# Patient Record
Sex: FEMALE | Race: BLACK OR AFRICAN AMERICAN | NOT HISPANIC OR LATINO | Employment: FULL TIME | URBAN - METROPOLITAN AREA
[De-identification: names, ages, dates, MRNs, and addresses within clinical notes are randomized per-mention and may not be internally consistent; named-entity substitution may affect disease eponyms.]

---

## 2020-12-09 ENCOUNTER — OFFICE VISIT (OUTPATIENT)
Dept: PRIMARY CARE CLINIC | Facility: CLINIC | Age: 55
End: 2020-12-09
Payer: MEDICAID

## 2020-12-09 VITALS
HEART RATE: 70 BPM | DIASTOLIC BLOOD PRESSURE: 88 MMHG | OXYGEN SATURATION: 99 % | WEIGHT: 293 LBS | BODY MASS INDEX: 50.02 KG/M2 | SYSTOLIC BLOOD PRESSURE: 142 MMHG | HEIGHT: 64 IN | RESPIRATION RATE: 18 BRPM

## 2020-12-09 DIAGNOSIS — Z23 FLU VACCINE NEED: ICD-10-CM

## 2020-12-09 DIAGNOSIS — E66.01 CLASS 3 SEVERE OBESITY WITH BODY MASS INDEX (BMI) OF 50.0 TO 59.9 IN ADULT, UNSPECIFIED OBESITY TYPE, UNSPECIFIED WHETHER SERIOUS COMORBIDITY PRESENT: ICD-10-CM

## 2020-12-09 DIAGNOSIS — F41.9 ANXIETY: Primary | ICD-10-CM

## 2020-12-09 DIAGNOSIS — Z12.39 ENCOUNTER FOR SCREENING FOR MALIGNANT NEOPLASM OF BREAST, UNSPECIFIED SCREENING MODALITY: ICD-10-CM

## 2020-12-09 PROBLEM — E66.813 CLASS 3 SEVERE OBESITY WITH BODY MASS INDEX (BMI) OF 50.0 TO 59.9 IN ADULT: Status: ACTIVE | Noted: 2020-12-09

## 2020-12-09 PROCEDURE — 99204 OFFICE O/P NEW MOD 45 MIN: CPT | Mod: 25,S$GLB,, | Performed by: INTERNAL MEDICINE

## 2020-12-09 PROCEDURE — 90686 IIV4 VACC NO PRSV 0.5 ML IM: CPT | Mod: S$GLB,,, | Performed by: INTERNAL MEDICINE

## 2020-12-09 PROCEDURE — 90471 PR IMMUNIZ ADMIN,1 SINGLE/COMB VAC/TOXOID: ICD-10-PCS | Mod: S$GLB,,, | Performed by: INTERNAL MEDICINE

## 2020-12-09 PROCEDURE — 90686 PR FLU VACCINE, QIIV4, NO PRSV, 0.5 ML, IM: ICD-10-PCS | Mod: S$GLB,,, | Performed by: INTERNAL MEDICINE

## 2020-12-09 PROCEDURE — 90471 IMMUNIZATION ADMIN: CPT | Mod: S$GLB,,, | Performed by: INTERNAL MEDICINE

## 2020-12-09 PROCEDURE — 99204 PR OFFICE/OUTPT VISIT, NEW, LEVL IV, 45-59 MIN: ICD-10-PCS | Mod: 25,S$GLB,, | Performed by: INTERNAL MEDICINE

## 2020-12-09 RX ORDER — HYDROXYZINE HYDROCHLORIDE 25 MG/1
25 TABLET, FILM COATED ORAL 3 TIMES DAILY PRN
Qty: 30 TABLET | Refills: 2 | Status: SHIPPED | OUTPATIENT
Start: 2020-12-09 | End: 2021-11-16

## 2020-12-09 NOTE — PROGRESS NOTES
Subjective:      Patient ID: Elvi Foote is a 54 y.o. female.    Chief Complaint: Establish Care and Annual Exam    HPI     Here to establish care. Had a colonoscopy last year in [place] and had some polyps removed and will likely need a repeat in 3-5 years. Last pap smear a long time ago, LMP 1.5 years ago  No smoking, no alcohol, no recreational drugs   Up to date on HIV and Hep C screening because she donates plasma   Reports migraines and has a history of them states it goes all over her head, has been to the ER in the past for a shot, takes a tylenol PM and sleeps it off  Has been tested for sleep apnea and does not have it  Reports having many things to do during the day and feels a bit anxious, wants to try something for anxiety and maybe start seeing a therapist      Family History   Problem Relation Age of Onset    Hypertension Mother     Arthritis Mother     Cancer Mother     Hypertension Father     Cancer Father     Arthritis Paternal Grandmother        Review of Systems   Constitutional: Negative for chills and fever.   HENT: Negative for congestion and ear pain.    Respiratory: Negative for cough, shortness of breath and wheezing.    Cardiovascular: Negative for chest pain, palpitations and leg swelling.   Gastrointestinal: Negative for abdominal pain, constipation, diarrhea, nausea and vomiting.   Genitourinary: Negative for dysuria, frequency and urgency.   Musculoskeletal: Negative for falls and joint pain.   Neurological: Positive for headaches. Negative for dizziness, focal weakness and weakness.   Psychiatric/Behavioral: Negative for substance abuse. The patient is nervous/anxious.      Objective:     Physical Exam  Constitutional:       Appearance: Normal appearance. She is obese. She is not ill-appearing.   HENT:      Head: Normocephalic.      Mouth/Throat:      Pharynx: Oropharynx is clear.   Eyes:      Extraocular Movements: Extraocular movements intact.      Conjunctiva/sclera:  "Conjunctivae normal.      Pupils: Pupils are equal, round, and reactive to light.   Neck:      Musculoskeletal: Normal range of motion.   Cardiovascular:      Rate and Rhythm: Normal rate and regular rhythm.   Pulmonary:      Effort: Pulmonary effort is normal.      Breath sounds: Normal breath sounds.   Abdominal:      General: Bowel sounds are normal.      Palpations: Abdomen is soft.   Musculoskeletal: Normal range of motion.   Skin:     General: Skin is warm.      Capillary Refill: Capillary refill takes less than 2 seconds.   Neurological:      General: No focal deficit present.      Mental Status: She is alert and oriented to person, place, and time.   Psychiatric:         Mood and Affect: Mood normal.        BP (!) 142/88 (BP Location: Right arm, Patient Position: Sitting, BP Method: Large (Manual))   Pulse 70   Resp 18   Ht 5' 4" (1.626 m)   Wt (!) 139.8 kg (308 lb 3.2 oz)   SpO2 99%   BMI 52.90 kg/m²     Assessment:       ICD-10-CM ICD-9-CM   1. Anxiety  F41.9 300.00   2. Encounter for screening for malignant neoplasm of breast, unspecified screening modality  Z12.39 V76.10   3. Class 3 severe obesity with body mass index (BMI) of 50.0 to 59.9 in adult, unspecified obesity type, unspecified whether serious comorbidity present  E66.01 278.01    Z68.43 V85.43   4. Flu vaccine need  Z23 V04.81       Plan:          Anxiety  -     hydrOXYzine HCL (ATARAX) 25 MG tablet; Take 1 tablet (25 mg total) by mouth 3 (three) times daily as needed for Anxiety.  Dispense: 30 tablet; Refill: 2  -     Ambulatory referral/consult to Psychology; Future; Expected date: 12/16/2020    Encounter for screening for malignant neoplasm of breast, unspecified screening modality  -     Mammo Digital Screening Bilat; Future; Expected date: 12/09/2020    Class 3 severe obesity with body mass index (BMI) of 50.0 to 59.9 in adult, unspecified obesity type, unspecified whether serious comorbidity present  -     Lipid Panel; Future; " Expected date: 12/09/2020  -     Comprehensive Metabolic Panel; Future; Expected date: 12/09/2020  -     TSH; Future; Expected date: 12/09/2020    Flu vaccine need  -     influenza (QUADRIVALENT PF) vaccine (VFC) 0.5 mL         30-minute visit. 10 minutes spent counseling patient on diet, exercise, and weight loss.     Needs to check BP a few times a week at home and notify me if continues to stay elevated. Advised not to eat processed foods as they are high in sodium and try to lose at least 40 lbs.   She can call insurance and find out which psychologists are covered under her insurance or she can walk in to behavioral health clinic on [facility]

## 2020-12-10 LAB
ALBUMIN SERPL-MCNC: 4.1 G/DL (ref 3.6–5.1)
ALBUMIN/GLOB SERPL: 1.7 (CALC) (ref 1–2.5)
ALP SERPL-CCNC: 65 U/L (ref 37–153)
ALT SERPL-CCNC: 13 U/L (ref 6–29)
AST SERPL-CCNC: 13 U/L (ref 10–35)
BILIRUB SERPL-MCNC: 0.5 MG/DL (ref 0.2–1.2)
BUN SERPL-MCNC: 8 MG/DL (ref 7–25)
BUN/CREAT SERPL: ABNORMAL (CALC) (ref 6–22)
CALCIUM SERPL-MCNC: 9 MG/DL (ref 8.6–10.4)
CHLORIDE SERPL-SCNC: 105 MMOL/L (ref 98–110)
CHOLEST SERPL-MCNC: 158 MG/DL
CHOLEST/HDLC SERPL: 2.4 (CALC)
CO2 SERPL-SCNC: 31 MMOL/L (ref 20–32)
CREAT SERPL-MCNC: 0.79 MG/DL (ref 0.5–1.05)
GFRSERPLBLD MDRD-ARVRAT: 86 ML/MIN/1.73M2
GLOBULIN SER CALC-MCNC: 2.4 G/DL (CALC) (ref 1.9–3.7)
GLUCOSE SERPL-MCNC: 103 MG/DL (ref 65–99)
HDLC SERPL-MCNC: 65 MG/DL
LDLC SERPL CALC-MCNC: 77 MG/DL (CALC)
NONHDLC SERPL-MCNC: 93 MG/DL (CALC)
POTASSIUM SERPL-SCNC: 3.7 MMOL/L (ref 3.5–5.3)
PROT SERPL-MCNC: 6.5 G/DL (ref 6.1–8.1)
SODIUM SERPL-SCNC: 143 MMOL/L (ref 135–146)
TRIGL SERPL-MCNC: 77 MG/DL
TSH SERPL-ACNC: 0.77 MIU/L

## 2021-06-09 ENCOUNTER — PATIENT OUTREACH (OUTPATIENT)
Dept: ADMINISTRATIVE | Facility: HOSPITAL | Age: 56
End: 2021-06-09

## 2021-07-01 ENCOUNTER — OFFICE VISIT (OUTPATIENT)
Dept: PRIMARY CARE CLINIC | Facility: CLINIC | Age: 56
End: 2021-07-01
Payer: MEDICAID

## 2021-07-01 VITALS
DIASTOLIC BLOOD PRESSURE: 91 MMHG | HEIGHT: 64 IN | HEART RATE: 76 BPM | SYSTOLIC BLOOD PRESSURE: 148 MMHG | WEIGHT: 293 LBS | BODY MASS INDEX: 50.02 KG/M2 | OXYGEN SATURATION: 100 %

## 2021-07-01 DIAGNOSIS — Z01.419 WELL WOMAN EXAM: Primary | ICD-10-CM

## 2021-07-01 DIAGNOSIS — M54.50 ACUTE LEFT-SIDED LOW BACK PAIN, UNSPECIFIED WHETHER SCIATICA PRESENT: ICD-10-CM

## 2021-07-01 PROCEDURE — 99213 OFFICE O/P EST LOW 20 MIN: CPT | Mod: S$GLB,,, | Performed by: INTERNAL MEDICINE

## 2021-07-01 PROCEDURE — 99213 PR OFFICE/OUTPT VISIT, EST, LEVL III, 20-29 MIN: ICD-10-PCS | Mod: S$GLB,,, | Performed by: INTERNAL MEDICINE

## 2021-07-02 ENCOUNTER — PATIENT MESSAGE (OUTPATIENT)
Dept: ADMINISTRATIVE | Facility: HOSPITAL | Age: 56
End: 2021-07-02

## 2021-11-16 DIAGNOSIS — R05.9 COUGH: Primary | ICD-10-CM

## 2021-11-16 RX ORDER — BENZONATATE 100 MG/1
100 CAPSULE ORAL 3 TIMES DAILY PRN
Qty: 30 CAPSULE | Refills: 0 | Status: SHIPPED | OUTPATIENT
Start: 2021-11-16 | End: 2021-11-26

## 2021-12-28 ENCOUNTER — TELEPHONE (OUTPATIENT)
Dept: PRIMARY CARE CLINIC | Facility: CLINIC | Age: 56
End: 2021-12-28
Payer: MEDICAID

## 2022-07-06 ENCOUNTER — PATIENT MESSAGE (OUTPATIENT)
Dept: PRIMARY CARE CLINIC | Facility: CLINIC | Age: 57
End: 2022-07-06
Payer: MEDICAID

## 2022-07-06 ENCOUNTER — TELEPHONE (OUTPATIENT)
Dept: PRIMARY CARE CLINIC | Facility: CLINIC | Age: 57
End: 2022-07-06
Payer: MEDICAID

## 2022-07-06 NOTE — TELEPHONE ENCOUNTER
----- Message from Judy Baumann LPN sent at 7/5/2022  4:07 PM CDT -----  Contact: self    ----- Message -----  From: Carissa Morrison  Sent: 7/5/2022   1:36 PM CDT  To: [facility] Staff    Type:  Sooner Apoointment Request    Caller is requesting a sooner appointment.  Caller declined first available appointment listed below.    Name of Caller: Elvi Foote   When is the first available appointment? Currently scheduled for on on 9/21 - next available is Oct 2022   Symptoms: Severe knee pain, she was seen in ER over the weekend and needs to see her doctor  Would the patient rather a call back or a response via [facility]?  Call back   Best Call Back Number:[phone]   Additional Information: n/a

## 2022-07-07 ENCOUNTER — OFFICE VISIT (OUTPATIENT)
Dept: PRIMARY CARE CLINIC | Facility: CLINIC | Age: 57
End: 2022-07-07
Payer: MEDICAID

## 2022-07-07 VITALS
BODY MASS INDEX: 50.02 KG/M2 | SYSTOLIC BLOOD PRESSURE: 159 MMHG | DIASTOLIC BLOOD PRESSURE: 87 MMHG | HEART RATE: 64 BPM | OXYGEN SATURATION: 100 % | WEIGHT: 293 LBS | HEIGHT: 64 IN

## 2022-07-07 DIAGNOSIS — G89.29 CHRONIC PAIN OF RIGHT KNEE: Primary | ICD-10-CM

## 2022-07-07 DIAGNOSIS — E66.01 CLASS 3 SEVERE OBESITY WITH BODY MASS INDEX (BMI) OF 50.0 TO 59.9 IN ADULT, UNSPECIFIED OBESITY TYPE, UNSPECIFIED WHETHER SERIOUS COMORBIDITY PRESENT: ICD-10-CM

## 2022-07-07 DIAGNOSIS — Z12.39 ENCOUNTER FOR SCREENING FOR MALIGNANT NEOPLASM OF BREAST, UNSPECIFIED SCREENING MODALITY: ICD-10-CM

## 2022-07-07 DIAGNOSIS — M25.561 CHRONIC PAIN OF RIGHT KNEE: Primary | ICD-10-CM

## 2022-07-07 PROCEDURE — 3077F PR MOST RECENT SYSTOLIC BLOOD PRESSURE >= 140 MM HG: ICD-10-PCS | Mod: CPTII,S$GLB,, | Performed by: INTERNAL MEDICINE

## 2022-07-07 PROCEDURE — 1159F MED LIST DOCD IN RCRD: CPT | Mod: CPTII,S$GLB,, | Performed by: INTERNAL MEDICINE

## 2022-07-07 PROCEDURE — 99214 OFFICE O/P EST MOD 30 MIN: CPT | Mod: S$GLB,,, | Performed by: INTERNAL MEDICINE

## 2022-07-07 PROCEDURE — 3008F BODY MASS INDEX DOCD: CPT | Mod: CPTII,S$GLB,, | Performed by: INTERNAL MEDICINE

## 2022-07-07 PROCEDURE — 3079F DIAST BP 80-89 MM HG: CPT | Mod: CPTII,S$GLB,, | Performed by: INTERNAL MEDICINE

## 2022-07-07 PROCEDURE — 3008F PR BODY MASS INDEX (BMI) DOCUMENTED: ICD-10-PCS | Mod: CPTII,S$GLB,, | Performed by: INTERNAL MEDICINE

## 2022-07-07 PROCEDURE — 1159F PR MEDICATION LIST DOCUMENTED IN MEDICAL RECORD: ICD-10-PCS | Mod: CPTII,S$GLB,, | Performed by: INTERNAL MEDICINE

## 2022-07-07 PROCEDURE — 3077F SYST BP >= 140 MM HG: CPT | Mod: CPTII,S$GLB,, | Performed by: INTERNAL MEDICINE

## 2022-07-07 PROCEDURE — 3079F PR MOST RECENT DIASTOLIC BLOOD PRESSURE 80-89 MM HG: ICD-10-PCS | Mod: CPTII,S$GLB,, | Performed by: INTERNAL MEDICINE

## 2022-07-07 PROCEDURE — 99214 PR OFFICE/OUTPT VISIT, EST, LEVL IV, 30-39 MIN: ICD-10-PCS | Mod: S$GLB,,, | Performed by: INTERNAL MEDICINE

## 2022-07-07 RX ORDER — MELOXICAM 7.5 MG/1
7.5 TABLET ORAL DAILY
Qty: 30 TABLET | Refills: 3 | Status: SHIPPED | OUTPATIENT
Start: 2022-07-07 | End: 2022-07-08

## 2022-07-07 RX ORDER — MELOXICAM 7.5 MG/1
TABLET ORAL
COMMUNITY
Start: 2022-07-02 | End: 2022-07-07 | Stop reason: SDUPTHER

## 2022-07-07 NOTE — PROGRESS NOTES
"Subjective:      Patient ID: Elvi Foote is a 56 y.o. female.    Chief Complaint: Follow-up (Went to the [place] ER on 07/01/22 due to pain in her right knee. Pt walked in limping. Given meloxicam, along with a steriod shot and a shot for pain. Xray done. With the medication she states "I mostly went to sleep." No swelling. Rated today at 4/10 stationary; with activity 6-9/10. Described as aching and radiates(burning)into her lower leg and up into her right hip. )    HPI     Patient here for above problems. She was given meloxicam for pain, she was also given a steroid injection, this pain is chronic and her xray showed tri-compartment degeneration. She was given a steroid injection in the subcutaneous tissue, not the knee joint. She states it helped just a little. She has been to PT in the past and remembers the exercises they gave her for her knee pain. She denies trauma. She also reports some muscular pains.   She does not smoke and has no h/o blood clots    Review of Systems   Constitutional: Negative for chills, fever and weight loss.        Weight gain     Respiratory: Negative for cough, shortness of breath and wheezing.    Cardiovascular: Negative for chest pain, palpitations and leg swelling.   Gastrointestinal: Negative for abdominal pain, constipation, diarrhea, nausea and vomiting.   Musculoskeletal: Positive for joint pain and myalgias. Negative for back pain.   Skin: Negative for rash.   Neurological: Negative for dizziness and headaches.     Objective:     Physical Exam  Vitals reviewed.   Constitutional:       Appearance: Normal appearance. She is obese.   HENT:      Head: Normocephalic.   Eyes:      Extraocular Movements: Extraocular movements intact.      Conjunctiva/sclera: Conjunctivae normal.      Pupils: Pupils are equal, round, and reactive to light.   Musculoskeletal:         General: Tenderness present.      Right lower leg: No edema.      Left lower leg: No edema.      Comments: She has pain " "with extension and flexion, no significant joint effusion palpated.    Skin:     General: Skin is warm.      Capillary Refill: Capillary refill takes less than 2 seconds.   Neurological:      General: No focal deficit present.      Mental Status: She is alert and oriented to person, place, and time.   Psychiatric:         Mood and Affect: Mood normal.        BP (!) 159/87 (BP Location: Right arm, Patient Position: Sitting, BP Method: Large (Automatic))   Pulse 64   Ht 5' 4" (1.626 m)   Wt (!) 143.3 kg (316 lb)   SpO2 100%   BMI 54.24 kg/m²     Assessment:       ICD-10-CM ICD-9-CM   1. Chronic pain of right knee  M25.561 719.46    G89.29 338.29   2. Encounter for screening for malignant neoplasm of breast, unspecified screening modality  Z12.39 V76.10   3. Class 3 severe obesity with body mass index (BMI) of 50.0 to 59.9 in adult, unspecified obesity type, unspecified whether serious comorbidity present  E66.01 278.01    Z68.43 V85.43       Plan:     Medication List with Changes/Refills   Changed and/or Refilled Medications    Modified Medication Previous Medication    MELOXICAM (MOBIC) 7.5 MG TABLET meloxicam (MOBIC) 7.5 MG tablet       Take 2 tablets (15 mg total) by mouth once daily.            Chronic pain of right knee  -     Ambulatory referral/consult to Orthopedics; Future; Expected date: 07/14/2022  -     Discontinue: meloxicam (MOBIC) 7.5 MG tablet; Take 1 tablet (7.5 mg total) by mouth once daily.  Dispense: 30 tablet; Refill: 3  -     MRI Knee Without Contrast Right; Future; Expected date: 07/07/2022  -     meloxicam (MOBIC) 7.5 MG tablet; Take 2 tablets (15 mg total) by mouth once daily.  Dispense: 60 tablet; Refill: 0    Encounter for screening for malignant neoplasm of breast, unspecified screening modality  -     Mammo Digital Screening Bilat; Future; Expected date: 07/07/2022    Class 3 severe obesity with body mass index (BMI) of 50.0 to 59.9 in adult, unspecified obesity type, unspecified " whether serious comorbidity present         I have ordered an MRI but not sure if it will be approved. She is able to bear weight but is limping. Our braces here are not big enough to fit the patient. I have difficulty assessing the landmarks of her knee due to obesity and she already had a steroid injection. She is ok with taking meloxicam      Future Appointments   Date Time Provider Department Center   10/12/2022  8:00 AM [facility]

## 2022-07-08 ENCOUNTER — PATIENT MESSAGE (OUTPATIENT)
Dept: ADMINISTRATIVE | Facility: HOSPITAL | Age: 57
End: 2022-07-08
Payer: MEDICAID

## 2022-07-08 ENCOUNTER — PATIENT MESSAGE (OUTPATIENT)
Dept: PRIMARY CARE CLINIC | Facility: CLINIC | Age: 57
End: 2022-07-08
Payer: MEDICAID

## 2022-07-08 RX ORDER — MELOXICAM 7.5 MG/1
15 TABLET ORAL DAILY
Qty: 60 TABLET | Refills: 0 | Status: SHIPPED | OUTPATIENT
Start: 2022-07-08 | End: 2022-09-16

## 2022-07-28 ENCOUNTER — PATIENT MESSAGE (OUTPATIENT)
Dept: PRIMARY CARE CLINIC | Facility: CLINIC | Age: 57
End: 2022-07-28
Payer: MEDICAID

## 2022-07-29 DIAGNOSIS — N95.0 POST-MENOPAUSAL BLEEDING: Primary | ICD-10-CM

## 2022-07-29 DIAGNOSIS — Z01.419 WELL WOMAN EXAM: ICD-10-CM

## 2022-08-01 ENCOUNTER — TELEPHONE (OUTPATIENT)
Dept: PRIMARY CARE CLINIC | Facility: CLINIC | Age: 57
End: 2022-08-01
Payer: MEDICAID

## 2022-08-01 NOTE — TELEPHONE ENCOUNTER
Pt states that she was having cramps the beginning of the week and started spotting and has been spotting for a week now. Pt states she has not had a Cycle in 3 years and would like an referral to a OBGYN                  ----- Message from Claudia Bravo sent at 8/1/2022  1:35 PM CDT -----  Contact: self  Pt calling for nurse for referral for gyn pt having female issues and would like discuss.  Pt can be reached at [phone].    Thanks,

## 2022-08-05 ENCOUNTER — OFFICE VISIT (OUTPATIENT)
Dept: OBSTETRICS AND GYNECOLOGY | Facility: CLINIC | Age: 57
End: 2022-08-05
Payer: MEDICAID

## 2022-08-05 VITALS
BODY MASS INDEX: 53.73 KG/M2 | DIASTOLIC BLOOD PRESSURE: 89 MMHG | WEIGHT: 293 LBS | SYSTOLIC BLOOD PRESSURE: 155 MMHG | HEART RATE: 94 BPM

## 2022-08-05 DIAGNOSIS — N95.0 POST-MENOPAUSAL BLEEDING: Primary | ICD-10-CM

## 2022-08-05 DIAGNOSIS — Z12.4 SCREENING FOR MALIGNANT NEOPLASM OF THE CERVIX: ICD-10-CM

## 2022-08-05 LAB
E2: 24.9 PG/ML
FSH: 35.4 MIU/ML
LH: 24.5 MIU/ML

## 2022-08-05 PROCEDURE — 3079F PR MOST RECENT DIASTOLIC BLOOD PRESSURE 80-89 MM HG: ICD-10-PCS | Mod: CPTII,S$GLB,, | Performed by: OBSTETRICS & GYNECOLOGY

## 2022-08-05 PROCEDURE — 1159F PR MEDICATION LIST DOCUMENTED IN MEDICAL RECORD: ICD-10-PCS | Mod: CPTII,S$GLB,, | Performed by: OBSTETRICS & GYNECOLOGY

## 2022-08-05 PROCEDURE — 3008F BODY MASS INDEX DOCD: CPT | Mod: CPTII,S$GLB,, | Performed by: OBSTETRICS & GYNECOLOGY

## 2022-08-05 PROCEDURE — 1159F MED LIST DOCD IN RCRD: CPT | Mod: CPTII,S$GLB,, | Performed by: OBSTETRICS & GYNECOLOGY

## 2022-08-05 PROCEDURE — 3077F SYST BP >= 140 MM HG: CPT | Mod: CPTII,S$GLB,, | Performed by: OBSTETRICS & GYNECOLOGY

## 2022-08-05 PROCEDURE — 99203 OFFICE O/P NEW LOW 30 MIN: CPT | Mod: S$GLB,,, | Performed by: OBSTETRICS & GYNECOLOGY

## 2022-08-05 PROCEDURE — 3079F DIAST BP 80-89 MM HG: CPT | Mod: CPTII,S$GLB,, | Performed by: OBSTETRICS & GYNECOLOGY

## 2022-08-05 PROCEDURE — 3008F PR BODY MASS INDEX (BMI) DOCUMENTED: ICD-10-PCS | Mod: CPTII,S$GLB,, | Performed by: OBSTETRICS & GYNECOLOGY

## 2022-08-05 PROCEDURE — 99203 PR OFFICE/OUTPT VISIT, NEW, LEVL III, 30-44 MIN: ICD-10-PCS | Mod: S$GLB,,, | Performed by: OBSTETRICS & GYNECOLOGY

## 2022-08-05 PROCEDURE — 3077F PR MOST RECENT SYSTOLIC BLOOD PRESSURE >= 140 MM HG: ICD-10-PCS | Mod: CPTII,S$GLB,, | Performed by: OBSTETRICS & GYNECOLOGY

## 2022-08-05 NOTE — PROGRESS NOTES
Subjective:       Patient ID: Elvi Foote is a 56 y.o. female.    Chief Complaint: Vaginal Bleeding    HPI  Review of Systems   Constitutional: Negative for activity change, appetite change, chills, fever and unexpected weight change.   HENT: Negative for nasal congestion, dental problem, facial swelling, hearing loss and mouth sores.    Respiratory: Negative for apnea, chest tightness, shortness of breath and wheezing.    Cardiovascular: Negative for chest pain and leg swelling.   Gastrointestinal: Negative for abdominal distention, abdominal pain, anal bleeding, blood in stool, constipation, diarrhea, nausea, rectal pain and vomiting.   Genitourinary: Positive for vaginal bleeding. Negative for decreased urine volume, difficulty urinating, dyspareunia, dysuria, enuresis, flank pain, frequency, genital sores, hematuria, menstrual problem, pelvic pain, urgency, vaginal discharge and vaginal pain.   Musculoskeletal: Negative for arthralgias, back pain, joint swelling, myalgias and neck pain.   Integumentary:  Negative for color change, pallor, rash and wound.   Allergic/Immunologic: Negative for immunocompromised state.   Neurological: Negative for dizziness, light-headedness and headaches.   Hematological: Negative for adenopathy. Does not bruise/bleed easily.   Psychiatric/Behavioral: Negative for agitation, behavioral problems, confusion, sleep disturbance and suicidal ideas. The patient is not nervous/anxious and is not hyperactive.          Objective:      Physical Exam    Assessment:       Problem List Items Addressed This Visit    None     Visit Diagnoses     Post-menopausal bleeding    -  Primary    Relevant Orders    Luteinizing Hormone    Follicle Stimulating Hormone    Estradiol    Liquid-based pap smear, screening    Screening for malignant neoplasm of the cervix        Relevant Orders    Liquid-based pap smear, screening          Plan:     FSH LH estradiol ultrasound

## 2022-08-05 NOTE — PROGRESS NOTES
Is a 56-year-old female here for annual exam she had multiple vaginal deliveries no C-sections.  She had several episodes of light spotting and then of little bit heavier spotting with cramps she had no periods for 3-4 years has not had Pap smear in several years and no history of abnormal Paps or fibroids.  Abdomen is obese pelvic exam cervix is visible without any lesions Pap smear was done is no discharge or lesions bimanual I do not feel any masses however her size of her abdomen makes it hinder an accurate exam.  I explained other workup to the patient will get FSH LH estradiol make sure she has complete the menopause and then do a pelvic ultrasound

## 2022-08-12 ENCOUNTER — PATIENT MESSAGE (OUTPATIENT)
Dept: PRIMARY CARE CLINIC | Facility: CLINIC | Age: 57
End: 2022-08-12
Payer: MEDICAID

## 2022-08-12 LAB — Lab: NORMAL

## 2022-08-15 DIAGNOSIS — N95.0 POST-MENOPAUSAL BLEEDING: Primary | ICD-10-CM

## 2022-08-16 ENCOUNTER — NURSE TRIAGE (OUTPATIENT)
Dept: ADMINISTRATIVE | Facility: CLINIC | Age: 57
End: 2022-08-16
Payer: MEDICAID

## 2022-08-16 ENCOUNTER — TELEPHONE (OUTPATIENT)
Dept: OBSTETRICS AND GYNECOLOGY | Facility: CLINIC | Age: 57
End: 2022-08-16

## 2022-08-16 ENCOUNTER — OFFICE VISIT (OUTPATIENT)
Dept: OBSTETRICS AND GYNECOLOGY | Facility: CLINIC | Age: 57
End: 2022-08-16
Payer: MEDICAID

## 2022-08-16 ENCOUNTER — PROCEDURE VISIT (OUTPATIENT)
Dept: OBSTETRICS AND GYNECOLOGY | Facility: CLINIC | Age: 57
End: 2022-08-16
Payer: MEDICAID

## 2022-08-16 DIAGNOSIS — N95.0 POST-MENOPAUSAL BLEEDING: ICD-10-CM

## 2022-08-16 DIAGNOSIS — N95.0 POSTMENOPAUSAL BLEEDING: Primary | ICD-10-CM

## 2022-08-16 PROCEDURE — 76830 TRANSVAGINAL US NON-OB: CPT | Mod: S$GLB,,, | Performed by: OBSTETRICS & GYNECOLOGY

## 2022-08-16 PROCEDURE — 76830 US OB/GYN PROCEDURE (VIEWPOINT): ICD-10-PCS | Mod: S$GLB,,, | Performed by: OBSTETRICS & GYNECOLOGY

## 2022-08-16 PROCEDURE — 99213 OFFICE O/P EST LOW 20 MIN: CPT | Mod: 25,S$GLB,, | Performed by: OBSTETRICS & GYNECOLOGY

## 2022-08-16 PROCEDURE — 99213 PR OFFICE/OUTPT VISIT, EST, LEVL III, 20-29 MIN: ICD-10-PCS | Mod: 25,S$GLB,, | Performed by: OBSTETRICS & GYNECOLOGY

## 2022-08-16 RX ORDER — MEDROXYPROGESTERONE ACETATE 10 MG/1
10 TABLET ORAL DAILY
Qty: 12 TABLET | Refills: 3 | Status: SHIPPED | OUTPATIENT
Start: 2022-08-16 | End: 2022-11-15 | Stop reason: SDUPTHER

## 2022-08-16 NOTE — TELEPHONE ENCOUNTER
Called patient back regarding vag burning. State it is getting somewhat better, can only feel burning pain now when moving. Enc'd to wash vaginal area and cont to rest, call back if pain unrelieved within a few hours or worsens. Pt v/u.

## 2022-08-16 NOTE — TELEPHONE ENCOUNTER
----- Message from Nancy Zuniga sent at 8/16/2022 11:44 AM CDT -----  Contact: Patient  Patient called to consult with nurse or staff regarding her appointment. She states she is having a burning sensation that is really bad and would like a call back. Patient can be reached at [phone]. Thanks/MR

## 2022-08-16 NOTE — PROGRESS NOTES
Subjective:       Patient ID: Elvi Foote is a 56 y.o. female.    Chief Complaint: Follow Up Ultrasound    Is a 56-year-old female who has a history of often on bleeding.  Ultrasound shows 8.7 mm endometrial thickness ovaries not visualized I did discuss endometrial biopsy with the patient she agreed however after the Betadine prep attempted but a single-toothed tenaculum on her cervix was so tender she could not even tolerate this therefore endometrial biopsy was abandoned even touching her cervix with up Monsel's was very painful therefore I am going to put her on Provera 10 mg 1st 10 days of each month for 3 months then repeat the ultrasound    Review of Systems   Constitutional: Negative for activity change, appetite change, chills, fever and unexpected weight change.   HENT: Negative for nasal congestion, dental problem, facial swelling, hearing loss and mouth sores.    Respiratory: Negative for apnea, chest tightness, shortness of breath and wheezing.    Cardiovascular: Negative for chest pain and leg swelling.   Gastrointestinal: Negative for abdominal distention, abdominal pain, anal bleeding, blood in stool, constipation, diarrhea, nausea, rectal pain and vomiting.   Genitourinary: Negative for decreased urine volume, difficulty urinating, dyspareunia, dysuria, enuresis, flank pain, frequency, genital sores, hematuria, menstrual problem, pelvic pain, urgency, vaginal bleeding, vaginal discharge and vaginal pain.   Musculoskeletal: Negative for arthralgias, back pain, joint swelling, myalgias and neck pain.   Integumentary:  Negative for color change, pallor, rash and wound.   Allergic/Immunologic: Negative for immunocompromised state.   Neurological: Negative for dizziness, light-headedness and headaches.   Hematological: Negative for adenopathy. Does not bruise/bleed easily.   Psychiatric/Behavioral: Negative for agitation, behavioral problems, confusion, sleep disturbance and suicidal ideas. The  patient is not nervous/anxious and is not hyperactive.          Objective:      Physical Exam  Genitourinary:     Comments: Attempted endometrial biopsy unsuccessful due to discomfort patient        Assessment:       Problem List Items Addressed This Visit    None         Plan:     repeat ultrasound after 3 months of Provera 10 mg for 12 days

## 2022-08-16 NOTE — TELEPHONE ENCOUNTER
"Seen today by Dr. PAT Aguilar, had vaginal US and pelvic exam for abnormal bleeding. Unable to do biopsy because of pain/burning. Pt states still having 7/10 "burning" pain inside of vagina. Bleeding only minimally. Denies abd pain. Pt tearful on phone. Pt does report she got dizziness when she got out of car but that resolved. Advised caller to rest and take tylenol/ibuprofen for now. 650mg tylenol every 4-6 hours to 400mg ibuprofen every 6-8 hours. Okay to use heating bad. Will route urgent message to OBGYN for urgent call-back in 1 hour    Reason for Disposition   MILD bleeding or SPOTTING after procedure (e.g., biopsy) or pelvic examination (e.g., pap smear) persisting < 4 days   Patient wants to be seen    Additional Information   Negative: SEVERE vaginal bleeding (e.g., continuous red blood from vagina, or large blood clots) and very weak (can't stand)   Negative: Passed out (i.e., fainted, collapsed and was not responding)   Negative: Difficult to awaken or acting confused (e.g., disoriented, slurred speech)   Negative: Shock suspected (e.g., cold/pale/clammy skin, too weak to stand, low BP, rapid pulse)   Negative: Sounds like a life-threatening emergency to the triager   Negative: SEVERE abdominal pain (e.g., excruciating)   Negative: SEVERE dizziness (e.g., unable to stand, requires support to walk, feels like passing out now)   Negative: SEVERE vaginal bleeding (e.g., soaking 2 pads or tampons per hour and present 2 or more hours; 1 menstrual cup every 2 hours)   Negative: MODERATE vaginal bleeding (e.g., soaking pad or tampon per hour and present > 6 hours; 1 menstrual cup every 6 hours)   Negative: Pale skin (pallor) of new-onset or worsening   Negative: Constant abdominal pain lasting > 2 hours   Negative: Patient sounds very sick or weak to the triager   Negative: Taking Coumadin (warfarin) or other strong blood thinner, or known bleeding disorder (e.g., thrombocytopenia)   Negative: " Skin bruises or nosebleed and not caused by an injury   Negative: Bleeding/spotting after procedure (e.g., biopsy) or pelvic examination (e.g., pap smear) that persists > 3 days   Negative: Patient wants to be seen   Negative: Passed tissue (e.g., gray-white)   Negative: Periods with > 6 soaked pads or tampons per day   Negative: Periods last > 7 days   Negative: Uses menstrual cups and more than 80 ml blood per menstrual period   Negative: Missed period has occurred 2 or more times in the last year and the cause is not known   Negative: Menstrual cycle < 21 days OR > 35 days, and occurs more than two cycles (2 months) this past year   Negative: Bleeding or spotting between regular periods occurs more than three cycles (3 months) this past year   Negative: Bleeding or spotting between regular periods occurs more than three cycles (3 months), and using birth control medicine (e.g., pills, patch, Depo-Provera, Implanon, vaginal ring, Mirena IUD)   Negative: Bleeding or spotting occurs after hysterectomy   Negative: Age > 39 years with irregular or excessive bleeding   Negative: Bleeding or spotting occurs after sex (Exception: first intercourse)   Negative: Sounds like a life-threatening emergency to the triager   Negative: Patient sounds very sick or weak to the triager   Negative: SEVERE pain and not improved 2 hours after pain medicine   Negative: Genital area looks infected (e.g., draining sore, spreading redness) and fever   Negative: Something is hanging out of the vagina and can't easily be pushed back inside   Negative: MILD-MODERATE pain and present > 24 hours (Exception: chronic pain)   Negative: Genital area looks infected (e.g., draining sore, spreading redness)   Negative: Rash with painful tiny water blisters   Negative: MODERATE-SEVERE itching (i.e., interferes with school, work, or sleep)   Negative: Rash (e.g., redness, tiny bumps, sore) of genital area and present > 24  hours    Protocols used: VAGINAL BLEEDING - [id number], VAGINAL SYMPTOMS-A-OH

## 2022-08-22 ENCOUNTER — TELEPHONE (OUTPATIENT)
Dept: PRIMARY CARE CLINIC | Facility: CLINIC | Age: 57
End: 2022-08-22
Payer: MEDICAID

## 2022-08-22 NOTE — TELEPHONE ENCOUNTER
"Juliette Long MD  Cc: [facility] Staff  Good afternoon Dr. JATIN LONG and staff,     As per the pt insurance   Denial Rationale   An [facility] Medical Director reviewed this request using [facility]'s Knee MRI (right) Guidelines. Your doctor's request is not approved because:   · Your doctor said you had knee pain.   · To review for approval, [facility] Clinical Guideline 057-4 for Lower Extremity MRI, requires the following information: doctor's notes that say you did exercises (physical therapy, chiropractic treatments, or medically directed home exercise program) for four weeks in the last six months. We also need to know the number of visits you went to. We need to know the dates you did those exercises and that you did not get better.   · The records we got did not show this.     [facility] [phone] tracking # [id number]     Thanks     Juliette Belcher   Senior Pre-Service [facility]   Radiology Department   [facility]   [address] [place] [address] [id number] [phone] and Fax [phone] Ext [phone]   [email or web address]                         Team   "Together Everyone Achieves More"        Referral        "

## 2022-09-23 LAB — BCS RECOMMENDATION EXT: NORMAL

## 2022-09-29 ENCOUNTER — PATIENT MESSAGE (OUTPATIENT)
Dept: PRIMARY CARE CLINIC | Facility: CLINIC | Age: 57
End: 2022-09-29
Payer: MEDICAID

## 2022-10-11 ENCOUNTER — PATIENT OUTREACH (OUTPATIENT)
Dept: ADMINISTRATIVE | Facility: HOSPITAL | Age: 57
End: 2022-10-11
Payer: MEDICAID

## 2022-10-12 ENCOUNTER — OFFICE VISIT (OUTPATIENT)
Dept: PRIMARY CARE CLINIC | Facility: CLINIC | Age: 57
End: 2022-10-12
Payer: MEDICAID

## 2022-10-12 ENCOUNTER — TELEPHONE (OUTPATIENT)
Dept: PRIMARY CARE CLINIC | Facility: CLINIC | Age: 57
End: 2022-10-12

## 2022-10-12 VITALS
SYSTOLIC BLOOD PRESSURE: 161 MMHG | BODY MASS INDEX: 50.02 KG/M2 | HEIGHT: 64 IN | OXYGEN SATURATION: 99 % | HEART RATE: 66 BPM | DIASTOLIC BLOOD PRESSURE: 92 MMHG | WEIGHT: 293 LBS

## 2022-10-12 DIAGNOSIS — G93.2 PSEUDOTUMOR CEREBRI: ICD-10-CM

## 2022-10-12 DIAGNOSIS — E66.01 CLASS 3 SEVERE OBESITY WITHOUT SERIOUS COMORBIDITY WITH BODY MASS INDEX (BMI) OF 50.0 TO 59.9 IN ADULT, UNSPECIFIED OBESITY TYPE: ICD-10-CM

## 2022-10-12 DIAGNOSIS — M25.50 ARTHRALGIA, UNSPECIFIED JOINT: ICD-10-CM

## 2022-10-12 DIAGNOSIS — Z79.1 NSAID LONG-TERM USE: ICD-10-CM

## 2022-10-12 DIAGNOSIS — R53.1 WEAKNESS: Primary | ICD-10-CM

## 2022-10-12 PROCEDURE — 3008F PR BODY MASS INDEX (BMI) DOCUMENTED: ICD-10-PCS | Mod: CPTII,S$GLB,, | Performed by: INTERNAL MEDICINE

## 2022-10-12 PROCEDURE — 99214 OFFICE O/P EST MOD 30 MIN: CPT | Mod: S$GLB,,, | Performed by: INTERNAL MEDICINE

## 2022-10-12 PROCEDURE — 3008F BODY MASS INDEX DOCD: CPT | Mod: CPTII,S$GLB,, | Performed by: INTERNAL MEDICINE

## 2022-10-12 PROCEDURE — 1159F PR MEDICATION LIST DOCUMENTED IN MEDICAL RECORD: ICD-10-PCS | Mod: CPTII,S$GLB,, | Performed by: INTERNAL MEDICINE

## 2022-10-12 PROCEDURE — 3077F PR MOST RECENT SYSTOLIC BLOOD PRESSURE >= 140 MM HG: ICD-10-PCS | Mod: CPTII,S$GLB,, | Performed by: INTERNAL MEDICINE

## 2022-10-12 PROCEDURE — 1159F MED LIST DOCD IN RCRD: CPT | Mod: CPTII,S$GLB,, | Performed by: INTERNAL MEDICINE

## 2022-10-12 PROCEDURE — 3080F PR MOST RECENT DIASTOLIC BLOOD PRESSURE >= 90 MM HG: ICD-10-PCS | Mod: CPTII,S$GLB,, | Performed by: INTERNAL MEDICINE

## 2022-10-12 PROCEDURE — 99214 PR OFFICE/OUTPT VISIT, EST, LEVL IV, 30-39 MIN: ICD-10-PCS | Mod: S$GLB,,, | Performed by: INTERNAL MEDICINE

## 2022-10-12 PROCEDURE — 3080F DIAST BP >= 90 MM HG: CPT | Mod: CPTII,S$GLB,, | Performed by: INTERNAL MEDICINE

## 2022-10-12 PROCEDURE — 3077F SYST BP >= 140 MM HG: CPT | Mod: CPTII,S$GLB,, | Performed by: INTERNAL MEDICINE

## 2022-10-12 RX ORDER — OMEPRAZOLE 40 MG/1
40 CAPSULE, DELAYED RELEASE ORAL DAILY
Qty: 30 CAPSULE | Refills: 2 | Status: SHIPPED | OUTPATIENT
Start: 2022-10-12 | End: 2023-05-03 | Stop reason: ALTCHOICE

## 2022-10-12 RX ORDER — BACLOFEN 10 MG/1
10 TABLET ORAL 3 TIMES DAILY
Qty: 30 TABLET | Refills: 0 | Status: SHIPPED | OUTPATIENT
Start: 2022-10-12 | End: 2023-05-03 | Stop reason: ALTCHOICE

## 2022-10-12 NOTE — PROGRESS NOTES
Subjective:      Patient ID: Elvi Foote is a 56 y.o. female.    Chief Complaint: Follow-up (The patient states she has been worse since seeing you last. She states she has had injections but knee's are better. Pain from the heel to the hip. Constant pain and keeps her up at hs. ) and Tingling (To B/L hands. She does have a referral to neurologist through opthalmology. )    HPI      As above. Patient went to see Dr herrera and was seen by his NP. She received knee injections on July 19th and is probably going to go back soon. She states she was diagnosed with pseudotumor cerebri but states her optic nerve was good per the ophthalmologist.   She states she has chronic leg pain and also states for the past 3 weeks           Review of Systems   Constitutional:  Negative for chills, fever and weight loss.   Eyes:  Positive for blurred vision.        Ophthalmologist gave eye drops for dryness   Respiratory:  Negative for cough, shortness of breath and wheezing.    Cardiovascular:  Negative for chest pain, palpitations and leg swelling.   Gastrointestinal:  Negative for abdominal pain, constipation, diarrhea, nausea and vomiting.   Genitourinary:  Negative for dysuria, frequency and urgency.   Musculoskeletal:  Positive for joint pain. Negative for falls.   Skin:  Negative for rash.   Neurological:  Positive for tingling and weakness. Negative for dizziness, seizures, loss of consciousness and headaches.   Endo/Heme/Allergies:  Does not bruise/bleed easily.   Objective:     Physical Exam  Vitals reviewed.   Constitutional:       Appearance: Normal appearance. She is obese.   HENT:      Head: Normocephalic.   Eyes:      Extraocular Movements: Extraocular movements intact.      Conjunctiva/sclera: Conjunctivae normal.      Pupils: Pupils are equal, round, and reactive to light.   Cardiovascular:      Rate and Rhythm: Normal rate and regular rhythm.   Pulmonary:      Effort: Pulmonary effort is normal.      Breath sounds:  "Normal breath sounds.   Abdominal:      General: Bowel sounds are normal.   Musculoskeletal:         General: No swelling, tenderness, deformity or signs of injury.      Right lower leg: No edema.      Left lower leg: No edema.   Skin:     General: Skin is warm.      Capillary Refill: Capillary refill takes less than 2 seconds.      Findings: No rash.   Neurological:      General: No focal deficit present.      Mental Status: She is alert and oriented to person, place, and time.      Motor: No weakness.      Gait: Gait normal.   Psychiatric:         Mood and Affect: Mood normal.     BP (!) 161/92 (BP Location: Right arm, Patient Position: Sitting, BP Method: X-Large (Automatic))   Pulse 66   Ht 5' 4" (1.626 m)   Wt (!) 144.1 kg (317 lb 9.6 oz)   SpO2 99%   BMI 54.52 kg/m²     Assessment:       ICD-10-CM ICD-9-CM   1. Weakness  R53.1 780.79   2. Pseudotumor cerebri  G93.2 348.2   3. Class 3 severe obesity without serious comorbidity with body mass index (BMI) of 50.0 to 59.9 in adult, unspecified obesity type  E66.01 278.01    Z68.43 V85.43   4. NSAID long-term use  Z79.1 V58.64   5. Arthralgia, unspecified joint  M25.50 719.40       Plan:     Medication List with Changes/Refills   New Medications    BACLOFEN (LIORESAL) 10 MG TABLET    Take 1 tablet (10 mg total) by mouth 3 (three) times daily. for 10 days    OMEPRAZOLE (PRILOSEC) 40 MG CAPSULE    Take 1 capsule (40 mg total) by mouth once daily.   Current Medications    MEDROXYPROGESTERONE (PROVERA) 10 MG TABLET    Take 1 tablet (10 mg total) by mouth once daily. for 12 doses    MELOXICAM (MOBIC) 7.5 MG TABLET    TAKE 2 TABLETS (15 MG TOTAL) BY MOUTH ONCE DAILY.        Weakness  -     CBC Auto Differential; Future; Expected date: 10/12/2022  -     Comprehensive Metabolic Panel; Future; Expected date: 10/12/2022  -     Iron, TIBC and Ferritin Panel; Future; Expected date: 10/12/2022    Pseudotumor cerebri  -     Ambulatory referral/consult to Neurology; Future; " Expected date: 10/19/2022    Class 3 severe obesity without serious comorbidity with body mass index (BMI) of 50.0 to 59.9 in adult, unspecified obesity type    NSAID long-term use  -     omeprazole (PRILOSEC) 40 MG capsule; Take 1 capsule (40 mg total) by mouth once daily.  Dispense: 30 capsule; Refill: 2    Arthralgia, unspecified joint  -     baclofen (LIORESAL) 10 MG tablet; Take 1 tablet (10 mg total) by mouth 3 (three) times daily. for 10 days  Dispense: 30 tablet; Refill: 0         Future Appointments   Date Time Provider Department Center   11/15/2022  9:00 AM ULTRASOUND, [facility] OBSHERICE [facility]   11/15/2022  9:15 AM Delfino Aguilar MD [facility]

## 2022-10-12 NOTE — TELEPHONE ENCOUNTER
bela billy saw Dr Stanley Ramirez Neurologist [facility]   [address] [facility] [address], [phone]

## 2022-10-13 ENCOUNTER — PATIENT MESSAGE (OUTPATIENT)
Dept: PRIMARY CARE CLINIC | Facility: CLINIC | Age: 57
End: 2022-10-13
Payer: MEDICAID

## 2022-10-13 LAB
ALBUMIN SERPL-MCNC: 3.9 G/DL (ref 3.6–5.1)
ALBUMIN/GLOB SERPL: 1.5 (CALC) (ref 1–2.5)
ALP SERPL-CCNC: 64 U/L (ref 37–153)
ALT SERPL-CCNC: 16 U/L (ref 6–29)
AST SERPL-CCNC: 13 U/L (ref 10–35)
BASOPHILS # BLD AUTO: 50 CELLS/UL (ref 0–200)
BASOPHILS NFR BLD AUTO: 0.7 %
BILIRUB SERPL-MCNC: 0.6 MG/DL (ref 0.2–1.2)
BUN SERPL-MCNC: 18 MG/DL (ref 7–25)
BUN/CREAT SERPL: NORMAL (CALC) (ref 6–22)
CALCIUM SERPL-MCNC: 9 MG/DL (ref 8.6–10.4)
CHLORIDE SERPL-SCNC: 105 MMOL/L (ref 98–110)
CO2 SERPL-SCNC: 30 MMOL/L (ref 20–32)
CREAT SERPL-MCNC: 0.86 MG/DL (ref 0.5–1.03)
EGFR: 80 ML/MIN/1.73M2
EOSINOPHIL # BLD AUTO: 121 CELLS/UL (ref 15–500)
EOSINOPHIL NFR BLD AUTO: 1.7 %
ERYTHROCYTE [DISTWIDTH] IN BLOOD BY AUTOMATED COUNT: 14.1 % (ref 11–15)
FERRITIN SERPL-MCNC: 85 NG/ML (ref 16–232)
GLOBULIN SER CALC-MCNC: 2.6 G/DL (CALC) (ref 1.9–3.7)
GLUCOSE SERPL-MCNC: 93 MG/DL (ref 65–139)
HCT VFR BLD AUTO: 41.3 % (ref 35–45)
HGB BLD-MCNC: 13.2 G/DL (ref 11.7–15.5)
IRON SATN MFR SERPL: 25 % (CALC) (ref 16–45)
IRON SERPL-MCNC: 82 MCG/DL (ref 45–160)
LYMPHOCYTES # BLD AUTO: 3273 CELLS/UL (ref 850–3900)
LYMPHOCYTES NFR BLD AUTO: 46.1 %
MCH RBC QN AUTO: 27.2 PG (ref 27–33)
MCHC RBC AUTO-ENTMCNC: 32 G/DL (ref 32–36)
MCV RBC AUTO: 85.2 FL (ref 80–100)
MONOCYTES # BLD AUTO: 511 CELLS/UL (ref 200–950)
MONOCYTES NFR BLD AUTO: 7.2 %
NEUTROPHILS # BLD AUTO: 3145 CELLS/UL (ref 1500–7800)
NEUTROPHILS NFR BLD AUTO: 44.3 %
PLATELET # BLD AUTO: 192 THOUSAND/UL (ref 140–400)
PMV BLD REES-ECKER: 12.4 FL (ref 7.5–12.5)
POTASSIUM SERPL-SCNC: 4.1 MMOL/L (ref 3.5–5.3)
PROT SERPL-MCNC: 6.5 G/DL (ref 6.1–8.1)
RBC # BLD AUTO: 4.85 MILLION/UL (ref 3.8–5.1)
SODIUM SERPL-SCNC: 142 MMOL/L (ref 135–146)
TIBC SERPL-MCNC: 327 MCG/DL (CALC) (ref 250–450)
WBC # BLD AUTO: 7.1 THOUSAND/UL (ref 3.8–10.8)

## 2022-11-14 ENCOUNTER — PATIENT OUTREACH (OUTPATIENT)
Dept: ADMINISTRATIVE | Facility: HOSPITAL | Age: 57
End: 2022-11-14
Payer: MEDICAID

## 2022-11-14 DIAGNOSIS — N95.0 POST-MENOPAUSAL BLEEDING: Primary | ICD-10-CM

## 2022-11-14 NOTE — PROGRESS NOTES
LC Colon non-compliant: No Colonoscopy result found in [employer], [employer] and [facility] Everywhere or media.

## 2022-11-15 ENCOUNTER — PROCEDURE VISIT (OUTPATIENT)
Dept: OBSTETRICS AND GYNECOLOGY | Facility: CLINIC | Age: 57
End: 2022-11-15
Payer: MEDICAID

## 2022-11-15 ENCOUNTER — OFFICE VISIT (OUTPATIENT)
Dept: OBSTETRICS AND GYNECOLOGY | Facility: CLINIC | Age: 57
End: 2022-11-15
Payer: MEDICAID

## 2022-11-15 VITALS — DIASTOLIC BLOOD PRESSURE: 82 MMHG | HEART RATE: 80 BPM | SYSTOLIC BLOOD PRESSURE: 158 MMHG

## 2022-11-15 DIAGNOSIS — N95.0 POST-MENOPAUSAL BLEEDING: ICD-10-CM

## 2022-11-15 DIAGNOSIS — R93.5 ABNORMAL ULTRASOUND OF ENDOMETRIUM: Primary | ICD-10-CM

## 2022-11-15 PROCEDURE — 1159F MED LIST DOCD IN RCRD: CPT | Mod: CPTII,S$GLB,, | Performed by: OBSTETRICS & GYNECOLOGY

## 2022-11-15 PROCEDURE — 1159F PR MEDICATION LIST DOCUMENTED IN MEDICAL RECORD: ICD-10-PCS | Mod: CPTII,S$GLB,, | Performed by: OBSTETRICS & GYNECOLOGY

## 2022-11-15 PROCEDURE — 76830 US OB/GYN PROCEDURE (VIEWPOINT): ICD-10-PCS | Mod: S$GLB,,, | Performed by: OBSTETRICS & GYNECOLOGY

## 2022-11-15 PROCEDURE — 3079F PR MOST RECENT DIASTOLIC BLOOD PRESSURE 80-89 MM HG: ICD-10-PCS | Mod: CPTII,S$GLB,, | Performed by: OBSTETRICS & GYNECOLOGY

## 2022-11-15 PROCEDURE — 3077F PR MOST RECENT SYSTOLIC BLOOD PRESSURE >= 140 MM HG: ICD-10-PCS | Mod: CPTII,S$GLB,, | Performed by: OBSTETRICS & GYNECOLOGY

## 2022-11-15 PROCEDURE — 3077F SYST BP >= 140 MM HG: CPT | Mod: CPTII,S$GLB,, | Performed by: OBSTETRICS & GYNECOLOGY

## 2022-11-15 PROCEDURE — 99213 PR OFFICE/OUTPT VISIT, EST, LEVL III, 20-29 MIN: ICD-10-PCS | Mod: 25,S$GLB,, | Performed by: OBSTETRICS & GYNECOLOGY

## 2022-11-15 PROCEDURE — 99213 OFFICE O/P EST LOW 20 MIN: CPT | Mod: 25,S$GLB,, | Performed by: OBSTETRICS & GYNECOLOGY

## 2022-11-15 PROCEDURE — 76830 TRANSVAGINAL US NON-OB: CPT | Mod: S$GLB,,, | Performed by: OBSTETRICS & GYNECOLOGY

## 2022-11-15 PROCEDURE — 3079F DIAST BP 80-89 MM HG: CPT | Mod: CPTII,S$GLB,, | Performed by: OBSTETRICS & GYNECOLOGY

## 2022-11-15 RX ORDER — MEDROXYPROGESTERONE ACETATE 10 MG/1
10 TABLET ORAL DAILY
Qty: 12 TABLET | Refills: 3 | Status: SHIPPED | OUTPATIENT
Start: 2022-11-15 | End: 2023-01-20

## 2022-11-15 RX ORDER — MEDROXYPROGESTERONE ACETATE 10 MG/1
10 TABLET ORAL DAILY
Qty: 12 TABLET | Refills: 3 | Status: SHIPPED | OUTPATIENT
Start: 2022-11-15 | End: 2022-11-15 | Stop reason: SDUPTHER

## 2022-11-15 NOTE — PROGRESS NOTES
A 56-year-old female who has put on some Provera for some postmenopausal bleeding she is had no bleeding after the last 2 Provera is her endometrium is 4 mm therefore with her weight I think she needs to stay on Provera 1st 12 days of each month thinks she can skip a month or 2 if she has no bleeding she has any kind of bleeding she needs to taken monthly for a while

## 2022-11-16 ENCOUNTER — PATIENT MESSAGE (OUTPATIENT)
Dept: ADMINISTRATIVE | Facility: HOSPITAL | Age: 57
End: 2022-11-16
Payer: MEDICAID

## 2023-01-20 ENCOUNTER — OFFICE VISIT (OUTPATIENT)
Dept: PRIMARY CARE CLINIC | Facility: CLINIC | Age: 58
End: 2023-01-20
Payer: MEDICAID

## 2023-01-20 VITALS
WEIGHT: 293 LBS | OXYGEN SATURATION: 97 % | BODY MASS INDEX: 50.02 KG/M2 | RESPIRATION RATE: 18 BRPM | HEIGHT: 64 IN | HEART RATE: 59 BPM

## 2023-01-20 DIAGNOSIS — M25.512 CHRONIC PAIN OF BOTH SHOULDERS: ICD-10-CM

## 2023-01-20 DIAGNOSIS — R20.2 NUMBNESS AND TINGLING: ICD-10-CM

## 2023-01-20 DIAGNOSIS — G93.2 PSEUDOTUMOR CEREBRI: ICD-10-CM

## 2023-01-20 DIAGNOSIS — G89.29 CHRONIC PAIN OF BOTH SHOULDERS: ICD-10-CM

## 2023-01-20 DIAGNOSIS — G56.03 BILATERAL CARPAL TUNNEL SYNDROME: Primary | ICD-10-CM

## 2023-01-20 DIAGNOSIS — R20.0 NUMBNESS AND TINGLING: ICD-10-CM

## 2023-01-20 DIAGNOSIS — M54.16 LUMBAR RADICULOPATHY, CHRONIC: ICD-10-CM

## 2023-01-20 DIAGNOSIS — M25.559 HIP PAIN: ICD-10-CM

## 2023-01-20 DIAGNOSIS — M25.511 CHRONIC PAIN OF BOTH SHOULDERS: ICD-10-CM

## 2023-01-20 PROCEDURE — 3008F BODY MASS INDEX DOCD: CPT | Mod: CPTII,S$GLB,, | Performed by: INTERNAL MEDICINE

## 2023-01-20 PROCEDURE — 3008F PR BODY MASS INDEX (BMI) DOCUMENTED: ICD-10-PCS | Mod: CPTII,S$GLB,, | Performed by: INTERNAL MEDICINE

## 2023-01-20 PROCEDURE — 99214 OFFICE O/P EST MOD 30 MIN: CPT | Mod: S$GLB,,, | Performed by: INTERNAL MEDICINE

## 2023-01-20 PROCEDURE — 1159F MED LIST DOCD IN RCRD: CPT | Mod: CPTII,S$GLB,, | Performed by: INTERNAL MEDICINE

## 2023-01-20 PROCEDURE — 1159F PR MEDICATION LIST DOCUMENTED IN MEDICAL RECORD: ICD-10-PCS | Mod: CPTII,S$GLB,, | Performed by: INTERNAL MEDICINE

## 2023-01-20 PROCEDURE — 99214 PR OFFICE/OUTPT VISIT, EST, LEVL IV, 30-39 MIN: ICD-10-PCS | Mod: S$GLB,,, | Performed by: INTERNAL MEDICINE

## 2023-01-20 RX ORDER — GABAPENTIN 300 MG/1
300 CAPSULE ORAL 3 TIMES DAILY
Qty: 90 CAPSULE | Refills: 11 | Status: SHIPPED | OUTPATIENT
Start: 2023-01-20 | End: 2023-05-03 | Stop reason: ALTCHOICE

## 2023-01-20 RX ORDER — ACETAZOLAMIDE 250 MG/1
250 TABLET ORAL 3 TIMES DAILY
Qty: 90 TABLET | Refills: 3 | Status: SHIPPED | OUTPATIENT
Start: 2023-01-20 | End: 2023-01-20

## 2023-01-20 NOTE — PROGRESS NOTES
Subjective:      Patient ID: Elvi Foote is a 57 y.o. female.    Chief Complaint: Back Pain (Pt states she is having pain starting at her back all the way down her le) and Numbness (Pt is having numbness and tingling in her hands and feet/)    HPI    Patient here for follow up.     I reviewed her old progress notes from 2016 where it reports a h/o pseudotumor cerebri for which she was on diamox and headaches were controlled. She has been to the ophthalmologist and was told she has the beginnings of cataracts but she is upset because apparently nobody told her that's what she had.   She was told she has no papilledema and in old progress notes from [facility] she failed 2 lumbar punctures because of pain and body habitus.   She was referred to neurology and was denied an appointment because they states they don't treat pseudotumor in her age group (per patient) so she was then referred to Dr Rosen with whom she has an appointment next month  She reports numbness and tingling in her extremities and she has had an EMG in the past but we don't have those reports. She states she should not be expected to remember everything about her diagnosis  She states she was never told she has carpal tunnel.  She also reports lower back pain, hip pain and knee pain and received knee and hip injections by Dr Sylvia QUIROS which helped somewhat. She also reports shoulder pain        Review of Systems   Constitutional:  Negative for chills and fever.   HENT:  Negative for hearing loss.    Eyes:  Positive for blurred vision.   Respiratory:  Negative for cough, shortness of breath and wheezing.    Cardiovascular:  Negative for chest pain, palpitations and leg swelling.   Gastrointestinal:  Negative for abdominal pain, constipation, diarrhea, nausea and vomiting.   Genitourinary:  Negative for dysuria, frequency and urgency.   Musculoskeletal:  Positive for back pain, joint pain, myalgias and neck pain. Negative for falls.   Skin:  " Negative for rash.   Neurological:  Positive for tingling and headaches. Negative for dizziness, speech change, focal weakness, seizures and weakness.   Objective:     Physical Exam  Vitals reviewed.   Constitutional:       Appearance: Normal appearance. She is obese.   HENT:      Head: Normocephalic.   Eyes:      Extraocular Movements: Extraocular movements intact.      Conjunctiva/sclera: Conjunctivae normal.      Pupils: Pupils are equal, round, and reactive to light.   Cardiovascular:      Rate and Rhythm: Normal rate and regular rhythm.   Pulmonary:      Effort: Pulmonary effort is normal.      Breath sounds: Normal breath sounds.   Abdominal:      General: Bowel sounds are normal.   Musculoskeletal:         General: Tenderness present. Normal range of motion.      Right lower leg: No edema.      Left lower leg: No edema.      Comments: Pain on palpation of lower back and hip, numbness and tingling reported in right hand   Skin:     Capillary Refill: Capillary refill takes less than 2 seconds.   Neurological:      General: No focal deficit present.      Mental Status: She is alert and oriented to person, place, and time.   Psychiatric:         Mood and Affect: Mood normal.     Pulse (!) 59   Resp 18   Ht 5' 4" (1.626 m)   Wt (!) 138.8 kg (306 lb)   SpO2 97%   BMI 52.52 kg/m²     Assessment:       ICD-10-CM ICD-9-CM   1. Bilateral carpal tunnel syndrome  G56.03 354.0   2. Numbness and tingling  R20.0 782.0    R20.2    3. Pseudotumor cerebri  G93.2 348.2   4. Chronic pain of both shoulders  M25.511 719.41    G89.29 338.29    M25.512    5. Hip pain  M25.559 719.45   6. Lumbar radiculopathy, chronic  M54.16 724.4       Plan:     Medication List with Changes/Refills   New Medications    GABAPENTIN (NEURONTIN) 300 MG CAPSULE    Take 1 capsule (300 mg total) by mouth 3 (three) times daily.   Current Medications    BACLOFEN (LIORESAL) 10 MG TABLET    Take 1 tablet (10 mg total) by mouth 3 (three) times daily. for " 10 days    MEDROXYPROGESTERONE (PROVERA) 10 MG TABLET    Take 1 tablet (10 mg total) by mouth once daily. for 12 doses    MELOXICAM (MOBIC) 7.5 MG TABLET    TAKE 2 TABLETS BY MOUTH ONCE DAILY    OMEPRAZOLE (PRILOSEC) 40 MG CAPSULE    Take 1 capsule (40 mg total) by mouth once daily.        1. Bilateral carpal tunnel syndrome  -     EMG W/ ULTRASOUND AND NERVE CONDUCTION TEST 2 Extremities; Future    2. Numbness and tingling  -     X-Ray Cervical Spine AP And Lateral; Future; Expected date: 01/20/2023  -     X-Ray Lumbar Spine 5 View; Future; Expected date: 01/20/2023  -     Ambulatory referral/consult to Neurosurgery; Future; Expected date: 01/27/2023  -     gabapentin (NEURONTIN) 300 MG capsule; Take 1 capsule (300 mg total) by mouth 3 (three) times daily.  Dispense: 90 capsule; Refill: 11  -     EMG W/ ULTRASOUND AND NERVE CONDUCTION TEST 2 Extremities; Future    3. Pseudotumor cerebri  -     Discontinue: acetaZOLAMIDE (DIAMOX) 250 MG tablet; Take 1 tablet (250 mg total) by mouth 3 (three) times daily.  Dispense: 90 tablet; Refill: 3    4. Chronic pain of both shoulders  -     X-Ray Shoulder Complete Bilateral; Future; Expected date: 01/20/2023    5. Hip pain  -     X-Ray Hips Bilateral 2 View Incl AP Pelvis; Future; Expected date: 01/20/2023    6. Lumbar radiculopathy, chronic  -     MRI Lumbar Spine Without Contrast; Future; Expected date: 01/20/2023           Future Appointments   Date Time Provider Department Center   2/9/2023  8:00 AM [facility]

## 2023-01-23 ENCOUNTER — PATIENT MESSAGE (OUTPATIENT)
Dept: NEUROSURGERY | Facility: CLINIC | Age: 58
End: 2023-01-23
Payer: MEDICAID

## 2023-02-02 ENCOUNTER — TELEPHONE (OUTPATIENT)
Dept: PRIMARY CARE CLINIC | Facility: CLINIC | Age: 58
End: 2023-02-02
Payer: MEDICAID

## 2023-02-02 ENCOUNTER — PATIENT MESSAGE (OUTPATIENT)
Dept: PRIMARY CARE CLINIC | Facility: CLINIC | Age: 58
End: 2023-02-02
Payer: MEDICAID

## 2023-02-02 DIAGNOSIS — G56.01 CARPAL TUNNEL SYNDROME OF RIGHT WRIST: Primary | ICD-10-CM

## 2023-02-02 NOTE — TELEPHONE ENCOUNTER
"FW: MRN: [id number]  Received: Today  BLOSSOM Dennis MA         Previous Messages     ----- Message -----   From: Juliette Belcher   Sent: 2/2/2023   9:48 AM CST   To: Angie Long MD, [facility] Staff   Subject: MRN: [id number]                                     Good morning Happy Thursday!!!     In regards to MRN: [id number]       Denial Rationale   · What was given to us in your doctor's notes or phone call does not meet [facility]'s Guidelines for a(n) Lumbar Spine MRI.   · Your doctor said you have back pain.   · According to [facility] Clinical Guideline 044 for Lumbar Spine MRI we need doctor's notes that say you did six weeks of back exercises (physical therapy, chiropractic treatments, or medically directed home exercise program) in the last six months. We also need to know the number of visits you went to. If you did this, the notes do not show the dates that you did the exercises before we can review for an approval.   · From the doctor's notes and/or phone call, we have not received the information needed.     A peer to peer may be done by calling [place] [employer] [phone] and using tracking [id number].     Thanks     Juliette Belcher   Senior Pre-Service [facility]   Radiology Department   Radiology/[facility]/Behavorial Health [address] ([place]) [place], [place] [id number] [phone] and Fax [phone] Ext [phone]   [email or web address]                         Team   "Together Everyone Achieves More"                Referral  Referral # [id number]  Referral Information    Referral # Creation Date Referral Status Status Update    [id number] 01/20/2023 Pending Review 01/20/2023: Status History     Status Reason Referral Type Referral Reasons Referral Class   Submitted MRI/CAT Scan none Internal     To Specialty To Provider To Location/Place of Service To Department   Radiology none none none     To Vendor Referred By " By Location/Place of Service By Department   none Angie Long MD [facility] PRIMARY CARE     Priority Start Date Expiration Date Referral Entered By   Routine 01/20/2023 01/20/2024 Angie Long MD     Visits Requested Visits Authorized Visits Completed Visits Scheduled   1 1       Procedure Information    Service Details  Procedure Modifiers Provider Requested Approved   [id number] - MRI LUMBAR SPINE WITHOUT CONTRAST None  1 1   23630 (CPT®) - [place]  MRI, LUMBAR SPINE None  1 1     Diagnosis Information    Diagnosis   M54.16 (ICD-10-CM) - Lumbar radiculopathy, chronic     Referral Notes  Number of Notes: 3  .  Type Date User Summary Attachment   Authorization Information 02/02/2023  9:49 AM Juliette Belcher Updates Status:Denied  -   Note    Updates Status:Denied           Diagnosis: Lumbar radiculopathy, chronic [M54.16]   Coded Procedure 10420  [id number]  23217          Status  Current Status:            Pending  Validity Period:            [Not Applicable]  Tracking Number:       [id number]              .  Type Date User Summary Attachment   Authorization Information 01/26/2023  7:27 AM Juliette Belcher - -   Note    Updates Status:This request is being reviewed by our physicians           Diagnosis: Lumbar radiculopathy, chronic [M54.16]   Coded Procedure 37842  88673  97936          Status  Current Status:            Pending  Validity Period:            [Not Applicable]  Tracking Number:       [id number]              .  Type Date User Summary Attachment   Authorization Information 01/20/2023 10:53 AM Juliette Belcher Diagnosis: Lumbar radiculopathy, chronic [M54.16] -   Note          Diagnosis: Lumbar radiculopathy, chronic [M54.16]   Coded Procedure 43894  18914  00141          Status  Current Status:            Pending  Validity Period:            [Not Applicable]  Tracking Number:       [id number]              Referral Order    Order   MRI Lumbar Spine Without Contrast (Order #  [id number]) on 01/20/2023   View Encounter

## 2023-02-06 ENCOUNTER — PATIENT MESSAGE (OUTPATIENT)
Dept: PRIMARY CARE CLINIC | Facility: CLINIC | Age: 58
End: 2023-02-06
Payer: MEDICAID

## 2023-02-07 ENCOUNTER — PATIENT MESSAGE (OUTPATIENT)
Dept: PRIMARY CARE CLINIC | Facility: CLINIC | Age: 58
End: 2023-02-07
Payer: MEDICAID

## 2023-02-08 DIAGNOSIS — E66.01 CLASS 3 SEVERE OBESITY WITHOUT SERIOUS COMORBIDITY WITH BODY MASS INDEX (BMI) OF 50.0 TO 59.9 IN ADULT, UNSPECIFIED OBESITY TYPE: Primary | ICD-10-CM

## 2023-02-08 DIAGNOSIS — M25.50 ARTHRALGIA, UNSPECIFIED JOINT: ICD-10-CM

## 2023-02-14 ENCOUNTER — PATIENT MESSAGE (OUTPATIENT)
Dept: PRIMARY CARE CLINIC | Facility: CLINIC | Age: 58
End: 2023-02-14
Payer: MEDICAID

## 2023-03-09 ENCOUNTER — NURSE TRIAGE (OUTPATIENT)
Dept: ADMINISTRATIVE | Facility: CLINIC | Age: 58
End: 2023-03-09
Payer: MEDICAID

## 2023-03-10 NOTE — TELEPHONE ENCOUNTER
"Pt's  calls on behalf of pt c/o back pain that radiates down the right side of her body and chest pain. He states, "She's had pain for months but tonight it is so bad that she is having me call you." Pt advises that her chest pain is worse than her back pain. Pt's  states, "She's really weak and she says she's cold." He notes that her chest pain has lasted longer than 5 minutes.    Care Advice recommends that pt's  calls  now. He states that he will call 911 now. He is also advised to call back if pt develops any new/worsening sxs or if they have any other questions or concerns. He verbalizes understanding.   Reason for Disposition   [1] Chest pain lasts > 5 minutes AND [2] age > 44    Additional Information   Negative: SEVERE difficulty breathing (e.g., struggling for each breath, speaks in single words)   Negative: Difficult to awaken or acting confused (e.g., disoriented, slurred speech)   Negative: Shock suspected (e.g., cold/pale/clammy skin, too weak to stand, low BP, rapid pulse)   Negative: Passed out (i.e., lost consciousness, collapsed and was not responding)    Protocols used: Chest Pain-A-AH    "

## 2023-03-31 ENCOUNTER — PATIENT MESSAGE (OUTPATIENT)
Dept: FAMILY MEDICINE | Facility: CLINIC | Age: 58
End: 2023-03-31
Payer: MEDICAID

## 2023-04-11 ENCOUNTER — PATIENT MESSAGE (OUTPATIENT)
Dept: RESEARCH | Facility: HOSPITAL | Age: 58
End: 2023-04-11
Payer: MEDICAID

## 2023-04-19 ENCOUNTER — PATIENT MESSAGE (OUTPATIENT)
Dept: RESEARCH | Facility: HOSPITAL | Age: 58
End: 2023-04-19
Payer: MEDICAID

## 2023-05-03 ENCOUNTER — OFFICE VISIT (OUTPATIENT)
Dept: PRIMARY CARE CLINIC | Facility: CLINIC | Age: 58
End: 2023-05-03
Payer: MEDICAID

## 2023-05-03 VITALS
WEIGHT: 293 LBS | OXYGEN SATURATION: 100 % | BODY MASS INDEX: 50.02 KG/M2 | DIASTOLIC BLOOD PRESSURE: 101 MMHG | HEART RATE: 56 BPM | HEIGHT: 64 IN | SYSTOLIC BLOOD PRESSURE: 145 MMHG

## 2023-05-03 DIAGNOSIS — G47.33 OSA (OBSTRUCTIVE SLEEP APNEA): ICD-10-CM

## 2023-05-03 DIAGNOSIS — Z12.11 SCREENING FOR COLON CANCER: ICD-10-CM

## 2023-05-03 DIAGNOSIS — E66.01 CLASS 3 SEVERE OBESITY WITHOUT SERIOUS COMORBIDITY WITH BODY MASS INDEX (BMI) OF 50.0 TO 59.9 IN ADULT, UNSPECIFIED OBESITY TYPE: ICD-10-CM

## 2023-05-03 DIAGNOSIS — G93.2 PSEUDOTUMOR CEREBRI: ICD-10-CM

## 2023-05-03 DIAGNOSIS — M54.16 LUMBAR RADICULOPATHY, CHRONIC: Primary | ICD-10-CM

## 2023-05-03 PROCEDURE — 3080F DIAST BP >= 90 MM HG: CPT | Mod: CPTII,S$GLB,, | Performed by: INTERNAL MEDICINE

## 2023-05-03 PROCEDURE — 3080F PR MOST RECENT DIASTOLIC BLOOD PRESSURE >= 90 MM HG: ICD-10-PCS | Mod: CPTII,S$GLB,, | Performed by: INTERNAL MEDICINE

## 2023-05-03 PROCEDURE — 3008F PR BODY MASS INDEX (BMI) DOCUMENTED: ICD-10-PCS | Mod: CPTII,S$GLB,, | Performed by: INTERNAL MEDICINE

## 2023-05-03 PROCEDURE — 1159F MED LIST DOCD IN RCRD: CPT | Mod: CPTII,S$GLB,, | Performed by: INTERNAL MEDICINE

## 2023-05-03 PROCEDURE — 99214 PR OFFICE/OUTPT VISIT, EST, LEVL IV, 30-39 MIN: ICD-10-PCS | Mod: S$GLB,,, | Performed by: INTERNAL MEDICINE

## 2023-05-03 PROCEDURE — 3077F PR MOST RECENT SYSTOLIC BLOOD PRESSURE >= 140 MM HG: ICD-10-PCS | Mod: CPTII,S$GLB,, | Performed by: INTERNAL MEDICINE

## 2023-05-03 PROCEDURE — 1159F PR MEDICATION LIST DOCUMENTED IN MEDICAL RECORD: ICD-10-PCS | Mod: CPTII,S$GLB,, | Performed by: INTERNAL MEDICINE

## 2023-05-03 PROCEDURE — 3077F SYST BP >= 140 MM HG: CPT | Mod: CPTII,S$GLB,, | Performed by: INTERNAL MEDICINE

## 2023-05-03 PROCEDURE — 3008F BODY MASS INDEX DOCD: CPT | Mod: CPTII,S$GLB,, | Performed by: INTERNAL MEDICINE

## 2023-05-03 PROCEDURE — 99214 OFFICE O/P EST MOD 30 MIN: CPT | Mod: S$GLB,,, | Performed by: INTERNAL MEDICINE

## 2023-05-03 RX ORDER — AMANTADINE HYDROCHLORIDE 100 MG/1
100 CAPSULE, GELATIN COATED ORAL DAILY
COMMUNITY
Start: 2023-03-30 | End: 2024-03-07

## 2023-05-03 RX ORDER — OXYBUTYNIN CHLORIDE 5 MG/1
5 TABLET ORAL 3 TIMES DAILY
COMMUNITY
Start: 2023-04-30

## 2023-05-03 RX ORDER — INDOMETHACIN 50 MG/1
50 CAPSULE ORAL 3 TIMES DAILY PRN
COMMUNITY
Start: 2023-05-01

## 2023-05-03 RX ORDER — LIDOCAINE 50 MG/G
1 PATCH TOPICAL DAILY
Qty: 30 PATCH | Refills: 0 | Status: SHIPPED | OUTPATIENT
Start: 2023-05-03 | End: 2023-09-01 | Stop reason: SDUPTHER

## 2023-05-03 RX ORDER — FLUOXETINE HYDROCHLORIDE 20 MG/1
CAPSULE ORAL
COMMUNITY
Start: 2023-03-30

## 2023-05-03 RX ORDER — ACETAZOLAMIDE 250 MG/1
TABLET ORAL
COMMUNITY
Start: 2023-03-30 | End: 2024-03-07

## 2023-05-03 RX ORDER — CARBAMAZEPINE 200 MG/1
TABLET ORAL
COMMUNITY
Start: 2023-03-30 | End: 2024-03-07

## 2023-05-03 RX ORDER — LIDOCAINE 50 MG/G
1 PATCH TOPICAL
COMMUNITY

## 2023-05-03 NOTE — PROGRESS NOTES
Subjective:      Patient ID: Elvi Foote is a 57 y.o. female.    Chief Complaint: Follow-up    HPI    Patient here for follow up    I reviewed her old progress notes from 2016 where it reports a h/o pseudotumor cerebri for which she was on diamox and headaches were controlled. She has been to the ophthalmologist and was told she has the beginnings of cataracts but she is upset because apparently nobody told her that's what she had.   She was told she has no papilledema and in old progress notes from [facility] she failed 2 lumbar punctures because of pain and body habitus.   She was referred to neurology and was denied an appointment because they states they don't treat pseudotumor in her age group (per patient) so she was then referred to Dr Rosen who started her on tegretol and diamox as well as amantadine (will need records) and has a f/u in June    She was also referred to Dr Bee for lower back pain and she was then referred to Dr Garcia for LP and opening pressure was documented as 40 mmHg    She reported numbness and tingling in her extremities and she has had an EMG in the past and was told she  has carpal tunnel but states nobody told her that    She also reported lower back pain, hip pain and knee pain and received knee and hip injections by Dr Ward NP which helped somewhat. She also reports shoulder pain      She is also seeing a nutritionist at [facility] and was seen last week    She was told to get a sleep study    She is extremely sleepy today and can barely keep her eyes open. She is communicating by whispering to her  who is informing me about all her progress            Review of Systems   Constitutional:  Positive for weight loss. Negative for chills and fever.        Intentional    HENT:  Negative for hearing loss.    Respiratory:  Negative for cough, shortness of breath and wheezing.    Cardiovascular:  Negative for chest pain, palpitations and leg swelling.  "  Gastrointestinal:  Negative for abdominal pain, blood in stool, constipation, diarrhea, melena, nausea and vomiting.   Genitourinary:  Negative for dysuria, frequency and urgency.   Musculoskeletal:  Positive for back pain, joint pain and myalgias. Negative for falls.   Skin:  Negative for rash.   Neurological:  Positive for headaches. Negative for dizziness and tremors.   Endo/Heme/Allergies:  Does not bruise/bleed easily.   Psychiatric/Behavioral:  Negative for depression. The patient is not nervous/anxious.    Objective:     Physical Exam  Vitals reviewed.   Constitutional:       Appearance: Normal appearance. She is obese. She is not ill-appearing.   HENT:      Head: Normocephalic.      Mouth/Throat:      Mouth: Mucous membranes are moist.      Pharynx: Oropharynx is clear.   Eyes:      Extraocular Movements: Extraocular movements intact.      Conjunctiva/sclera: Conjunctivae normal.      Pupils: Pupils are equal, round, and reactive to light.   Cardiovascular:      Rate and Rhythm: Normal rate and regular rhythm.   Pulmonary:      Effort: Pulmonary effort is normal.      Breath sounds: Normal breath sounds.   Abdominal:      General: Bowel sounds are normal.   Musculoskeletal:      Right lower leg: No edema.      Left lower leg: No edema.   Skin:     General: Skin is warm.      Capillary Refill: Capillary refill takes less than 2 seconds.   Neurological:      General: No focal deficit present.   Psychiatric:         Mood and Affect: Mood normal.     BP (!) 145/101 (BP Location: Left arm, Patient Position: Sitting, BP Method: X-Large (Automatic))   Pulse (!) 56   Ht 5' 4" (1.626 m)   Wt 133.8 kg (295 lb)   SpO2 100%   BMI 50.64 kg/m²     Assessment:       ICD-10-CM ICD-9-CM   1. Lumbar radiculopathy, chronic  M54.16 724.4   2. CORINA (obstructive sleep apnea)  G47.33 327.23   3. Screening for colon cancer  Z12.11 V76.51   4. Class 3 severe obesity without serious comorbidity with body mass index (BMI) of " 50.0 to 59.9 in adult, unspecified obesity type  E66.01 278.01    Z68.43 V85.43   5. Pseudotumor cerebri  G93.2 348.2       Plan:     Medication List with Changes/Refills   New Medications    LIDOCAINE (LIDODERM) 5 %    Place 1 patch onto the skin once daily. Remove & Discard patch within 12 hours or as directed by MD   Current Medications    ACETAZOLAMIDE (DIAMOX) 250 MG TABLET    Take by mouth. TAKE 1 TO 2 TABS TID DAILY    AMANTADINE HCL (SYMMETREL) 100 MG CAPSULE    Take 100 mg by mouth once daily.    CARBAMAZEPINE (TEGRETOL) 200 MG TABLET    TAKE 1/2 TAB  TO 1 TAB PO 4 TIMES A DAY FOR NERVE PAIN.    FLUOXETINE 20 MG CAPSULE    Take by mouth. TAKE 1 TO 2 TABS QD IN THE AM    INDOMETHACIN (INDOCIN) 50 MG CAPSULE    Take 50 mg by mouth 3 (three) times daily as needed.    LIDOCAINE (LIDODERM) 5 %    Place 1 patch onto the skin every 24 hours. Remove & Discard patch within 12 hours or as directed by MD    LIDOCAINE 4 % SPRA    Apply topically. INHALE 1-2 SPRAYS IN THE NOSTRIL EVERY 2 HRS PRN    MEDROXYPROGESTERONE (PROVERA) 10 MG TABLET    Take 1 tablet (10 mg total) by mouth once daily. for 12 doses    OXYBUTYNIN (DITROPAN) 5 MG TAB    Take 5 mg by mouth 3 (three) times daily.   Discontinued Medications    BACLOFEN (LIORESAL) 10 MG TABLET    Take 1 tablet (10 mg total) by mouth 3 (three) times daily. for 10 days    GABAPENTIN (NEURONTIN) 300 MG CAPSULE    Take 1 capsule (300 mg total) by mouth 3 (three) times daily.    MELOXICAM (MOBIC) 7.5 MG TABLET    Take 2 tablets (15 mg total) by mouth once daily.    OMEPRAZOLE (PRILOSEC) 40 MG CAPSULE    Take 1 capsule (40 mg total) by mouth once daily.        1. Lumbar radiculopathy, chronic  -     LIDOcaine (LIDODERM) 5 %; Place 1 patch onto the skin once daily. Remove & Discard patch within 12 hours or as directed by MD  Dispense: 30 patch; Refill: 0    2. CORINA (obstructive sleep apnea)  -     Ambulatory referral/consult to Pulmonology; Future; Expected date:  05/10/2023    3. Screening for colon cancer  -     Ambulatory referral/consult to General Surgery; Future; Expected date: 05/10/2023    4. Class 3 severe obesity without serious comorbidity with body mass index (BMI) of 50.0 to 59.9 in adult, unspecified obesity type    5. Pseudotumor cerebri           Future Appointments   Date Time Provider Department Center   6/16/2023 10:30 AM NURSE ANISH, GENERAL SURGERY [facility]   6/23/2023  9:30 AM ANISH SURGERY, GEN SURG [facility]   9/18/2023  1:00 PM Brie Fernández MD [facility] PULM  Tre   11/8/2023  9:20 AM Angie Long MD [facility] PRICG5 [facility]

## 2023-05-09 ENCOUNTER — PATIENT MESSAGE (OUTPATIENT)
Dept: RESEARCH | Facility: HOSPITAL | Age: 58
End: 2023-05-09
Payer: MEDICAID

## 2023-06-02 ENCOUNTER — OFFICE VISIT (OUTPATIENT)
Dept: URGENT CARE | Facility: CLINIC | Age: 58
End: 2023-06-02
Payer: MEDICAID

## 2023-06-02 VITALS
BODY MASS INDEX: 50.02 KG/M2 | HEART RATE: 59 BPM | RESPIRATION RATE: 17 BRPM | TEMPERATURE: 98 F | OXYGEN SATURATION: 98 % | HEIGHT: 64 IN | DIASTOLIC BLOOD PRESSURE: 90 MMHG | WEIGHT: 293 LBS | SYSTOLIC BLOOD PRESSURE: 148 MMHG

## 2023-06-02 DIAGNOSIS — K22.4 ESOPHAGEAL SPASM: ICD-10-CM

## 2023-06-02 DIAGNOSIS — J38.5 LARYNGEAL SPASM: ICD-10-CM

## 2023-06-02 DIAGNOSIS — R06.09 EXERTIONAL DYSPNEA: ICD-10-CM

## 2023-06-02 DIAGNOSIS — K21.9 CHEST PAIN DUE TO GASTROINTESTINAL REFLUX DISEASE: ICD-10-CM

## 2023-06-02 DIAGNOSIS — J98.01 BRONCHOSPASM, ACUTE: ICD-10-CM

## 2023-06-02 DIAGNOSIS — K21.00 GASTROESOPHAGEAL REFLUX DISEASE WITH ESOPHAGITIS WITHOUT HEMORRHAGE: Primary | ICD-10-CM

## 2023-06-02 DIAGNOSIS — R07.9 CHEST PAIN DUE TO GASTROINTESTINAL REFLUX DISEASE: ICD-10-CM

## 2023-06-02 DIAGNOSIS — R07.89 DISCOMFORT IN CHEST: ICD-10-CM

## 2023-06-02 PROCEDURE — 99214 OFFICE O/P EST MOD 30 MIN: CPT | Mod: S$GLB,,, | Performed by: NURSE PRACTITIONER

## 2023-06-02 PROCEDURE — 93005 EKG 12-LEAD: ICD-10-PCS | Mod: S$GLB,,, | Performed by: NURSE PRACTITIONER

## 2023-06-02 PROCEDURE — 93010 ELECTROCARDIOGRAM REPORT: CPT | Mod: S$PBB,,, | Performed by: INTERNAL MEDICINE

## 2023-06-02 PROCEDURE — 93010 EKG 12-LEAD: ICD-10-PCS | Mod: S$PBB,,, | Performed by: INTERNAL MEDICINE

## 2023-06-02 PROCEDURE — 99214 PR OFFICE/OUTPT VISIT, EST, LEVL IV, 30-39 MIN: ICD-10-PCS | Mod: S$GLB,,, | Performed by: NURSE PRACTITIONER

## 2023-06-02 PROCEDURE — 93005 ELECTROCARDIOGRAM TRACING: CPT | Mod: S$GLB,,, | Performed by: NURSE PRACTITIONER

## 2023-06-02 RX ORDER — BENZONATATE 100 MG/1
100 CAPSULE ORAL 3 TIMES DAILY PRN
Qty: 30 CAPSULE | Refills: 0 | Status: SHIPPED | OUTPATIENT
Start: 2023-06-02 | End: 2023-06-12

## 2023-06-02 RX ORDER — PANTOPRAZOLE SODIUM 40 MG/1
40 TABLET, DELAYED RELEASE ORAL DAILY
Qty: 30 TABLET | Refills: 0 | Status: SHIPPED | OUTPATIENT
Start: 2023-06-02 | End: 2023-07-02

## 2023-06-02 RX ORDER — LIDOCAINE HYDROCHLORIDE 20 MG/ML
10 SOLUTION OROPHARYNGEAL
Status: COMPLETED | OUTPATIENT
Start: 2023-06-02 | End: 2023-06-02

## 2023-06-02 RX ORDER — LIDOCAINE HYDROCHLORIDE 20 MG/ML
SOLUTION OROPHARYNGEAL
Qty: 180 ML | Refills: 0 | Status: SHIPPED | OUTPATIENT
Start: 2023-06-02

## 2023-06-02 RX ORDER — DICYCLOMINE HYDROCHLORIDE 10 MG/5ML
20 SOLUTION ORAL
Status: COMPLETED | OUTPATIENT
Start: 2023-06-02 | End: 2023-06-02

## 2023-06-02 RX ORDER — MAG HYDROX/ALUMINUM HYD/SIMETH 200-200-20
30 SUSPENSION, ORAL (FINAL DOSE FORM) ORAL
Status: COMPLETED | OUTPATIENT
Start: 2023-06-02 | End: 2023-06-02

## 2023-06-02 RX ORDER — HYOSCYAMINE SULFATE 0.12 MG/5ML
250 LIQUID ORAL EVERY 6 HOURS PRN
Qty: 180 ML | Refills: 0 | Status: SHIPPED | OUTPATIENT
Start: 2023-06-02 | End: 2024-03-07

## 2023-06-02 RX ADMIN — DICYCLOMINE HYDROCHLORIDE 20 MG: 10 SOLUTION ORAL at 12:06

## 2023-06-02 RX ADMIN — LIDOCAINE HYDROCHLORIDE 10 ML: 20 SOLUTION OROPHARYNGEAL at 11:06

## 2023-06-02 RX ADMIN — Medication 30 ML: at 11:06

## 2023-06-02 NOTE — PROGRESS NOTES
"Subjective:      Patient ID: Elvi Foote is a 57 y.o. female.    Vitals:  height is 5' 4" (1.626 m) and weight is 133.8 kg (295 lb). Her tympanic temperature is 98.2 °F (36.8 °C). Her blood pressure is 169/94 (abnormal) and her pulse is 59 (abnormal). Her respiration is 17 and oxygen saturation is 98%.     Chief Complaint: Sore Throat    Patient presents with dry cough and sore throat since two months ago. Patient states she started prozac when symptoms started and discontinued the medication. There is are no cold symptoms or drainage and no productivity with the cough. There is difficulty swallowing with pain and very dry mouth. There are no OTC medications       Discontinued Prozac ~2 weeks ago due to side effects including dry mouth, persistent cough, and increased depressive s/s. States dry mouth has improved however she still experiences persistent nonproductive cough and throat pain. Denies fever, nausea, vomiting. H/o GERD diagnosed per EGD; is not on PPI.   Also reports intermittent dyshagia to certain foods and pills which she states "sometimes it gets stuck like it doesn't want to go all the way down". She ate apple slices this am and noticed difficulty swallowing the skins; also states "it felt like it was scraping the back of my throat".  Of note, she reports ~1 mo h/o dyspnea on exertion which is activity limiting and occasionally requires rest period before activity resumption. Last week while shopping, she had to stop and rest 2/2 SOB and chest heaviness. Denies PmHx HTN, CAD,CHF, COPD, asthma, tobacco use.   Denies allergy/sinus s/s.      Sore Throat   This is a new problem. The current episode started more than 1 month ago. The problem has been unchanged. Neither side of throat is experiencing more pain than the other. There has been no fever. The fever has been present for Less than 1 day. The pain is at a severity of 5/10. The pain is moderate. Associated symptoms include coughing, a hoarse " voice, shortness of breath and trouble swallowing. Pertinent negatives include no abdominal pain, congestion, diarrhea, drooling, ear discharge, ear pain, headaches, plugged ear sensation, neck pain, stridor, swollen glands or vomiting. She has had no exposure to strep or mono. Treatments tried: stopped taking prozac from these symptoms. The treatment provided no relief.     Constitution: Positive for activity change. Negative for chills, sweating, fatigue and fever.   HENT:  Positive for sore throat, trouble swallowing and voice change. Negative for ear pain, ear discharge, drooling, facial swelling, congestion, postnasal drip and sinus pain.    Neck: Negative for neck pain, neck stiffness and neck swelling.   Cardiovascular:  Positive for chest pain and sob on exertion. Negative for leg swelling.   Respiratory:  Positive for cough and shortness of breath. Negative for sputum production, bloody sputum, COPD, stridor, wheezing and asthma.    Gastrointestinal:  Negative for abdominal pain, abdominal bloating, nausea, vomiting, diarrhea and heartburn.   Skin:  Negative for rash.   Allergic/Immunologic: Negative for environmental allergies, seasonal allergies, asthma, chronic cough and sneezing.   Neurological:  Negative for dizziness, light-headedness, passing out, headaches, disorientation and altered mental status.   Psychiatric/Behavioral:  Negative for altered mental status, disorientation, confusion and agitation.     Objective:     Physical Exam   Constitutional: She is oriented to person, place, and time.  Non-toxic appearance. She does not appear ill. No distress. obesity  HENT:   Head: Normocephalic and atraumatic.   Ears:   Right Ear: External ear normal.   Left Ear: External ear normal.   Nose: Mucosal edema present. No rhinorrhea or purulent discharge.      Comments: Mild bilateral nasal mucosal edema  Mouth/Throat: Uvula is midline and mucous membranes are normal. Mucous membranes are moist. No oral  lesions. No uvula swelling. Posterior oropharyngeal erythema and cobblestoning present. No oropharyngeal exudate, posterior oropharyngeal edema or tonsillar abscesses. No tonsillar exudate.   Eyes: Conjunctivae are normal. Extraocular movement intact   Neck: Neck supple.   Cardiovascular: Regular rhythm and normal heart sounds. Bradycardia present.   No murmur heard.  Pulmonary/Chest: Effort normal and breath sounds normal. No stridor. No respiratory distress. She has no wheezes. She has no rhonchi. She has no rales.   Nonproductive cough noted during exam.         Comments: Nonproductive cough noted during exam.    Abdominal: Normal appearance and bowel sounds are normal. She exhibits no distension and no mass. Soft. There is no abdominal tenderness. There is no guarding.   Musculoskeletal: Normal range of motion.         General: Normal range of motion.      Right lower leg: No edema.      Left lower leg: No edema.   Lymphadenopathy:        Head (right side): Submandibular adenopathy present.        Head (left side): Submandibular adenopathy present.   Neurological: no focal deficit. She is alert, oriented to person, place, and time and at baseline.   Skin: Skin is warm, dry and not diaphoretic.   Psychiatric: Her behavior is normal. Mood and thought content normal.   Nursing note and vitals reviewed.chaperone present ( present for exam)     Assessment:     1. Bronchospasm, acute    2. Exertional dyspnea    3. Discomfort in chest    4. Esophageal spasm        Plan:   XR CHEST PA AND LATERAL    Result Date: 6/2/2023  EXAM: XR CHEST PA AND LATERAL CLINICAL HISTORY:  [J98.01]-Acute bronchospasm./[R06.09]-Other forms of dyspnea./[R07.89]-Other chest pain. TECHNIQUE: PA and lateral views of the chest. COMPARISON: None available. FINDINGS:  Cardiomediastinal silhouette is within normal limits. Trachea is midline. Pulmonary vasculature is normal. Lungs appear underinflated but otherwise clear. No significant  pleural effusion or pneumothorax. No acute bony abnormality.  Degenerative changes noted in the spine.  Mild lumbar levocurvature.  Cholecystectomy clips noted in the right upper quadrant.     No acute cardiopulmonary abnormality. Finalized on: 6/2/2023 12:06 PM By:  William Underwood MD [facility]# [id number]      2023-06-02 12:08:46.450    [facility]         Bronchospasm, acute  -     XR CHEST PA AND LATERAL; Future; Expected date: 06/02/2023    Exertional dyspnea  -     XR CHEST PA AND LATERAL; Future; Expected date: 06/02/2023  -     IN OFFICE EKG 12-LEAD (to Muse)    Discomfort in chest  -     XR CHEST PA AND LATERAL; Future; Expected date: 06/02/2023  -     IN OFFICE EKG 12-LEAD (to Muse)    Esophageal spasm  -     dicyclomine 10 mg/5 mL syrup 20 mg  -     LIDOcaine HCl 2% oral solution 10 mL  -     aluminum-magnesium hydroxide-simethicone 200-200-20 mg/5 mL suspension 30 mL          Medical Decision Making:   History:   Old Records Summarized: records from clinic visits.       <> Summary of Records: Reviewed most recent pcp office visit note.  Initial Assessment:       Differential Diagnosis:   My differential diagnosis includes but is not limited to pneumonia, sinusitis, upper respiratory infection, asthma, COPD, allergic rhinitis, GERD, viral syndrome, postnasal drip, ACE-I induced bronchospasm, Reflux esophagitis, esophageal stricture.          Clinical Tests:   Radiological Study: Ordered  Medical Tests: Ordered  Urgent Care Management:  EKG: abnormal EKG, sinus bradycardia rate of 51 BPM, no prior tracings available. Cardiologist interpretation to follow    Her s/s are most likely r/t reflux esophagitis with esophageal spasm, however strict ER precautions were given to pt and spouse regarding her report of chest discomfort and exertional dyspnea. Understanding was verbalized by pt and spouse.   Cardiac related chest pain can be reasonably excluded without acute ischemic changes on EKG and resolution of chest discomfort  following administration of GI cocktail. GI related chest pain can be reasonably presumed as her s/s are consistent with that of esophageal spasms/esophagitis including: globus sensation, dysphagia, CP, hoarseness, cough.   Bronchospasm was significantly improved following administration of GI cocktail also.  No clinical suspicion for acute respiratory illness/infection per radiologist's interpretation of CXR as negative for acute cardiopulmonary abnormality. No concern for ACE-I induced bronchospasm as pt is not currently taking ACE-I.     Will treat supportively with PPI, GI cocktail for acute s/s, proper diet education,and pcp follow up for GI referral for upper endoscopy.      Additional MDM:     Heart Failure Score:   COPD = No

## 2023-06-02 NOTE — PATIENT INSTRUCTIONS
Please follow up with your primary care provider within 2-5 days if your signs and symptoms have not resolved or worsen.     If your condition worsens or fails to improve we recommend that you receive another evaluation at the emergency room immediately or contact your primary medical clinic to discuss your concerns.   You must understand that you have received an Urgent Care treatment only and that you may be released before all of your medical problems are known or treated. You, the patient, will arrange for follow up care as instructed.      If you were not prescribed any reflux medications, it is OK to take over the counter Pepcid or Zantac or Prilosec as directed.  Avoid fatty,greasy,fried,spicy foods. Avoid Alcohol. Avoid laying down for 2 hours after eating.   If your symptoms continue, make sure to follow up with a gastroenterologist to get an Upper GI scope and referral to a cardiologist to evaluate your shortness of breath complaint.

## 2023-06-05 ENCOUNTER — TELEPHONE (OUTPATIENT)
Dept: URGENT CARE | Facility: CLINIC | Age: 58
End: 2023-06-05
Payer: MEDICAID

## 2023-06-05 ENCOUNTER — OUTSIDE PLACE OF SERVICE (OUTPATIENT)
Dept: PULMONOLOGY | Facility: CLINIC | Age: 58
End: 2023-06-05
Payer: MEDICAID

## 2023-06-07 ENCOUNTER — PATIENT MESSAGE (OUTPATIENT)
Dept: PRIMARY CARE CLINIC | Facility: CLINIC | Age: 58
End: 2023-06-07
Payer: MEDICAID

## 2023-06-07 PROCEDURE — 95810 PR POLYSOMNOGRAPHY, 4 OR MORE: ICD-10-PCS | Mod: 26,,, | Performed by: INTERNAL MEDICINE

## 2023-06-07 PROCEDURE — 95810 POLYSOM 6/> YRS 4/> PARAM: CPT | Mod: 26,,, | Performed by: INTERNAL MEDICINE

## 2023-06-08 ENCOUNTER — PATIENT MESSAGE (OUTPATIENT)
Dept: RESEARCH | Facility: HOSPITAL | Age: 58
End: 2023-06-08
Payer: MEDICAID

## 2023-06-15 ENCOUNTER — CLINICAL SUPPORT (OUTPATIENT)
Dept: SURGERY | Facility: CLINIC | Age: 58
End: 2023-06-15
Payer: MEDICAID

## 2023-06-15 VITALS — WEIGHT: 286.13 LBS | BODY MASS INDEX: 48.85 KG/M2 | HEIGHT: 64 IN

## 2023-06-15 DIAGNOSIS — Z86.010 PERSONAL HISTORY OF COLONIC POLYPS: ICD-10-CM

## 2023-06-15 DIAGNOSIS — Z12.11 SCREENING FOR COLON CANCER: Primary | ICD-10-CM

## 2023-06-15 RX ORDER — QUETIAPINE FUMARATE 50 MG/1
50 TABLET, FILM COATED ORAL NIGHTLY
COMMUNITY
Start: 2023-06-13 | End: 2024-03-07

## 2023-06-15 NOTE — PROGRESS NOTES
Pt is here for colonoscopy instructions - colonoscopy scheduled 06/23/2023 w/ Dr. Diehl. Pt received packet of instructions and verbalized understanding. Orders faxed to  fx [phone]. Fax successfully went through. Sutab pended into pt's chart for MD to approve on Monday - coupon not needed.

## 2023-06-15 NOTE — PROGRESS NOTES
"[facility]  [address], [address]  [facility] [address]  Phone: [phone]  Fax: [phone]    History & Physical         Provider: Dr. Devin Diehl DO    Patient Name: Elvi YANG (age):1965  57 y.o.           Gender: female   Phone: [phone]     Referring Physician:  Angie Long MD    Vital Signs:   Height - 5' 4"   Weight - 129.8 kg  BMI -  49.11 kg/m2     Chief Complaint: Repeat colonoscopy screening is due. Pt has no c/o at this time.    Plan: Colonoscopy    Encounter Diagnoses   Name Primary?    Screening for colon cancer Yes    Personal history of colonic polyps            History:      Past Medical History:   Diagnosis Date    Anxiety     Arthritis     Knee pain     RIGHT     Migraines     Personal history of colonic polyps     Plantar fasciitis     Pseudotumor       Past Surgical History:   Procedure Laterality Date    CHOLECYSTECTOMY      COLONOSCOPY      TONSILLECTOMY        Medication List with Changes/Refills   Current Medications    ACETAZOLAMIDE (DIAMOX) 250 MG TABLET    Take by mouth. TAKE 1 TO 2 TABS TID DAILY    ALUMINUM-MAGNESIUM HYDROXIDE 200-200 MG/5 ML SUSPENSION    Take 30 mLs by mouth every 6 (six) hours as needed for Indigestion.    AMANTADINE HCL (SYMMETREL) 100 MG CAPSULE    Take 100 mg by mouth once daily.    CARBAMAZEPINE (TEGRETOL) 200 MG TABLET    TAKE 1/2 TAB  TO 1 TAB PO 4 TIMES A DAY FOR NERVE PAIN.    FLUOXETINE 20 MG CAPSULE    Take by mouth. TAKE 1 TO 2 TABS QD IN THE AM    HYOSCYAMINE (LEVSIN) 0.125 MG/5 ML ELIX    Take 10 mLs (250 mcg total) by mouth every 6 (six) hours as needed (esophageal spasms/throat pain). Mixture of Viscous lidocaine 10 ml + Maalox 30 ml + Hyoscyamine 10 ml  taken as a single dose (50ml) every 6 hours prn.    INDOMETHACIN (INDOCIN) 50 MG CAPSULE    Take 50 mg by mouth 3 (three) times daily as needed.    LIDOCAINE (LIDODERM) 5 %    " Place 1 patch onto the skin every 24 hours. Remove & Discard patch within 12 hours or as directed by MD    LIDOCAINE (LIDODERM) 5 %    Place 1 patch onto the skin once daily. Remove & Discard patch within 12 hours or as directed by MD    LIDOCAINE 4 % SPRA    Apply topically. INHALE 1-2 SPRAYS IN THE NOSTRIL EVERY 2 HRS PRN    LIDOCAINE HCL 2% (LIDOCAINE VISCOUS) 2 % SOLN    10 ml viscous lidocaine + maalox 30 ml + hyoscyamine 10 ml taken as a single dose (50 ml) every 6 hours prn    MEDROXYPROGESTERONE (PROVERA) 10 MG TABLET    Take 1 tablet (10 mg total) by mouth once daily. for 12 doses    OXYBUTYNIN (DITROPAN) 5 MG TAB    Take 5 mg by mouth 3 (three) times daily.    PANTOPRAZOLE (PROTONIX) 40 MG TABLET    Take 1 tablet (40 mg total) by mouth once daily.    QUETIAPINE (SEROQUEL) 50 MG TABLET    Take 50 mg by mouth nightly.      Review of patient's allergies indicates:  No Known Allergies   Family History   Problem Relation Age of Onset    Hypertension Mother     Arthritis Mother     Cancer Mother     Hypertension Father     Colon cancer Father 60    Arthritis Paternal Grandmother       Social History     Tobacco Use    Smoking status: Never    Smokeless tobacco: Never   Substance Use Topics    Alcohol use: Not Currently    Drug use: Never        Physical Examination:     General Appearance:___________________________  HEENT: _____________________________________  Abdomen:____________________________________  Heart:________________________________________  Lungs:_______________________________________  Extremities:___________________________________  Skin:_________________________________________  Endocrine:____________________________________  Genitourinary:_________________________________  Neurological:__________________________________      Patient has been evaluated immediately prior to sedation and is medically cleared for endoscopy with IVCS as an ASA class: ______      Physician Signature:  _________________________       Date: ________  Time: ________

## 2023-06-19 RX ORDER — SOD SULF/POT CHLORIDE/MAG SULF 1.479 G
12 TABLET ORAL DAILY
Qty: 24 TABLET | Refills: 0 | Status: SHIPPED | OUTPATIENT
Start: 2023-06-19 | End: 2024-03-07

## 2023-06-20 ENCOUNTER — OFFICE VISIT (OUTPATIENT)
Dept: PRIMARY CARE CLINIC | Facility: CLINIC | Age: 58
End: 2023-06-20
Payer: MEDICAID

## 2023-06-20 VITALS
OXYGEN SATURATION: 99 % | SYSTOLIC BLOOD PRESSURE: 130 MMHG | BODY MASS INDEX: 48.83 KG/M2 | HEIGHT: 64 IN | WEIGHT: 286 LBS | HEART RATE: 62 BPM | RESPIRATION RATE: 18 BRPM | DIASTOLIC BLOOD PRESSURE: 80 MMHG

## 2023-06-20 DIAGNOSIS — J02.9 SORE THROAT: Primary | ICD-10-CM

## 2023-06-20 DIAGNOSIS — E66.01 CLASS 3 SEVERE OBESITY WITHOUT SERIOUS COMORBIDITY WITH BODY MASS INDEX (BMI) OF 50.0 TO 59.9 IN ADULT, UNSPECIFIED OBESITY TYPE: ICD-10-CM

## 2023-06-20 DIAGNOSIS — R05.9 COUGH, UNSPECIFIED TYPE: ICD-10-CM

## 2023-06-20 DIAGNOSIS — M54.16 LUMBAR RADICULOPATHY, CHRONIC: ICD-10-CM

## 2023-06-20 DIAGNOSIS — G47.33 OSA (OBSTRUCTIVE SLEEP APNEA): ICD-10-CM

## 2023-06-20 LAB
CTP QC/QA: YES
S PYO RRNA THROAT QL PROBE: NEGATIVE

## 2023-06-20 PROCEDURE — 1159F MED LIST DOCD IN RCRD: CPT | Mod: CPTII,S$GLB,, | Performed by: INTERNAL MEDICINE

## 2023-06-20 PROCEDURE — 3075F PR MOST RECENT SYSTOLIC BLOOD PRESS GE 130-139MM HG: ICD-10-PCS | Mod: CPTII,S$GLB,, | Performed by: INTERNAL MEDICINE

## 2023-06-20 PROCEDURE — 99214 OFFICE O/P EST MOD 30 MIN: CPT | Mod: S$GLB,,, | Performed by: INTERNAL MEDICINE

## 2023-06-20 PROCEDURE — 3008F BODY MASS INDEX DOCD: CPT | Mod: CPTII,S$GLB,, | Performed by: INTERNAL MEDICINE

## 2023-06-20 PROCEDURE — 3079F DIAST BP 80-89 MM HG: CPT | Mod: CPTII,S$GLB,, | Performed by: INTERNAL MEDICINE

## 2023-06-20 PROCEDURE — 1159F PR MEDICATION LIST DOCUMENTED IN MEDICAL RECORD: ICD-10-PCS | Mod: CPTII,S$GLB,, | Performed by: INTERNAL MEDICINE

## 2023-06-20 PROCEDURE — 3008F PR BODY MASS INDEX (BMI) DOCUMENTED: ICD-10-PCS | Mod: CPTII,S$GLB,, | Performed by: INTERNAL MEDICINE

## 2023-06-20 PROCEDURE — 87880 STREP A ASSAY W/OPTIC: CPT | Mod: QW,,, | Performed by: INTERNAL MEDICINE

## 2023-06-20 PROCEDURE — 99214 PR OFFICE/OUTPT VISIT, EST, LEVL IV, 30-39 MIN: ICD-10-PCS | Mod: S$GLB,,, | Performed by: INTERNAL MEDICINE

## 2023-06-20 PROCEDURE — 3075F SYST BP GE 130 - 139MM HG: CPT | Mod: CPTII,S$GLB,, | Performed by: INTERNAL MEDICINE

## 2023-06-20 PROCEDURE — 87880 POCT RAPID STREP A: ICD-10-PCS | Mod: QW,,, | Performed by: INTERNAL MEDICINE

## 2023-06-20 PROCEDURE — 3079F PR MOST RECENT DIASTOLIC BLOOD PRESSURE 80-89 MM HG: ICD-10-PCS | Mod: CPTII,S$GLB,, | Performed by: INTERNAL MEDICINE

## 2023-06-20 RX ORDER — FLUTICASONE PROPIONATE 50 MCG
1 SPRAY, SUSPENSION (ML) NASAL DAILY
Qty: 16 G | Refills: 0 | Status: SHIPPED | OUTPATIENT
Start: 2023-06-20 | End: 2023-08-21

## 2023-06-20 NOTE — PROGRESS NOTES
Subjective:      Patient ID: Elvi Foote is a 57 y.o. female.    Chief Complaint: Follow-up (Pt is following up from her urgent care visit. Pt states she went to urgent care because she had a sore throat but they told her she was having acid reflux ) and Cough (Dry cough)    HPI    Past Medical History:   Diagnosis Date    Anxiety     Arthritis     Knee pain     RIGHT     Migraines     Personal history of colonic polyps     Plantar fasciitis     Pseudotumor        Patient recently seen at an urgent care for sore throat, cough. She had a chest xray which was negative. She was prescribed maalox suspension and states it just numbed her throat and she stopped taking it. Also prescribed protonix and she took it but didn't remember what it was from   Patient states cough is continuous, she states it mostly bothers her at night   She does not take any allergy medications       Review of Systems   Constitutional:  Negative for chills and fever.   HENT:  Positive for sore throat. Negative for hearing loss.         Reports dry throat   Eyes:  Negative for blurred vision.   Respiratory:  Positive for cough. Negative for shortness of breath and wheezing.    Cardiovascular:  Negative for chest pain, palpitations and leg swelling.   Gastrointestinal:  Negative for abdominal pain, blood in stool, constipation, diarrhea, melena, nausea and vomiting.   Genitourinary:  Negative for dysuria, frequency and urgency.   Musculoskeletal:  Negative for falls.   Skin:  Negative for rash.   Neurological:  Negative for dizziness and headaches.   Endo/Heme/Allergies:  Does not bruise/bleed easily.   Psychiatric/Behavioral:  Negative for depression. The patient is not nervous/anxious.    Objective:     Physical Exam  Vitals reviewed.   Constitutional:       Appearance: Normal appearance.   HENT:      Head: Normocephalic.      Nose:      Comments: Hypertrophic turbinates     Mouth/Throat:      Mouth: Mucous membranes are moist.      Pharynx:  "Oropharynx is clear.   Eyes:      Extraocular Movements: Extraocular movements intact.      Conjunctiva/sclera: Conjunctivae normal.      Pupils: Pupils are equal, round, and reactive to light.   Cardiovascular:      Rate and Rhythm: Normal rate and regular rhythm.   Pulmonary:      Effort: Pulmonary effort is normal.      Breath sounds: Normal breath sounds.   Abdominal:      General: Bowel sounds are normal.   Musculoskeletal:      Right lower leg: No edema.      Left lower leg: No edema.   Skin:     General: Skin is warm.      Capillary Refill: Capillary refill takes less than 2 seconds.   Neurological:      General: No focal deficit present.      Mental Status: She is alert.   Psychiatric:         Mood and Affect: Mood normal.     /80 (BP Location: Right arm, Patient Position: Sitting, BP Method: Medium (Automatic))   Pulse 62   Resp 18   Ht 5' 4" (1.626 m)   Wt 129.7 kg (286 lb)   SpO2 99%   BMI 49.09 kg/m²     Assessment:       ICD-10-CM ICD-9-CM   1. Sore throat  J02.9 462   2. CORINA (obstructive sleep apnea)  G47.33 327.23   3. Lumbar radiculopathy, chronic  M54.16 724.4   4. Cough, unspecified type  R05.9 786.2   5. Class 3 severe obesity without serious comorbidity with body mass index (BMI) of 50.0 to 59.9 in adult, unspecified obesity type  E66.01 278.01    Z68.43 V85.43       Plan:     Medication List with Changes/Refills   New Medications    FLUTICASONE PROPIONATE (FLONASE) 50 MCG/ACTUATION NASAL SPRAY    1 spray (50 mcg total) by Each Nostril route once daily.   Current Medications    ACETAZOLAMIDE (DIAMOX) 250 MG TABLET    Take by mouth. TAKE 1 TO 2 TABS TID DAILY    ALUMINUM-MAGNESIUM HYDROXIDE 200-200 MG/5 ML SUSPENSION    Take 30 mLs by mouth every 6 (six) hours as needed for Indigestion.    AMANTADINE HCL (SYMMETREL) 100 MG CAPSULE    Take 100 mg by mouth once daily.    CARBAMAZEPINE (TEGRETOL) 200 MG TABLET    TAKE 1/2 TAB  TO 1 TAB PO 4 TIMES A DAY FOR NERVE PAIN.    FLUOXETINE 20 MG " CAPSULE    Take by mouth. TAKE 1 TO 2 TABS QD IN THE AM    HYOSCYAMINE (LEVSIN) 0.125 MG/5 ML ELIX    Take 10 mLs (250 mcg total) by mouth every 6 (six) hours as needed (esophageal spasms/throat pain). Mixture of Viscous lidocaine 10 ml + Maalox 30 ml + Hyoscyamine 10 ml  taken as a single dose (50ml) every 6 hours prn.    INDOMETHACIN (INDOCIN) 50 MG CAPSULE    Take 50 mg by mouth 3 (three) times daily as needed.    LIDOCAINE (LIDODERM) 5 %    Place 1 patch onto the skin every 24 hours. Remove & Discard patch within 12 hours or as directed by MD    LIDOCAINE (LIDODERM) 5 %    Place 1 patch onto the skin once daily. Remove & Discard patch within 12 hours or as directed by MD    LIDOCAINE 4 % SPRA    Apply topically. INHALE 1-2 SPRAYS IN THE NOSTRIL EVERY 2 HRS PRN    LIDOCAINE HCL 2% (LIDOCAINE VISCOUS) 2 % SOLN    10 ml viscous lidocaine + maalox 30 ml + hyoscyamine 10 ml taken as a single dose (50 ml) every 6 hours prn    MEDROXYPROGESTERONE (PROVERA) 10 MG TABLET    Take 1 tablet (10 mg total) by mouth once daily. for 12 doses    OXYBUTYNIN (DITROPAN) 5 MG TAB    Take 5 mg by mouth 3 (three) times daily.    PANTOPRAZOLE (PROTONIX) 40 MG TABLET    Take 1 tablet (40 mg total) by mouth once daily.    QUETIAPINE (SEROQUEL) 50 MG TABLET    Take 50 mg by mouth nightly.    SOD SULF-POT CHLORIDE-MAG SULF (SUTAB) 1.479-0.188- 0.225 GRAM TABLET    Take 12 tablets by mouth once daily. Take according to package instructions with indicated amount of water.        1. Sore throat  -     POCT RAPID STREP A  -     fluticasone propionate (FLONASE) 50 mcg/actuation nasal spray; 1 spray (50 mcg total) by Each Nostril route once daily.  Dispense: 16 g; Refill: 0    2. CORINA (obstructive sleep apnea)    3. Lumbar radiculopathy, chronic    4. Cough, unspecified type    5. Class 3 severe obesity without serious comorbidity with body mass index (BMI) of 50.0 to 59.9 in adult, unspecified obesity type       Strep negative      Future  Appointments   Date Time Provider Department Center   6/23/2023  9:30 AM [facility], [facility]ENSURG A[facility] [place]   9/18/2023  1:00 PM Brie Fernández MD [facility] PULM  [address]   11/8/2023  9:20 AM Angie Long MD [facility] PRICG5 [facility] [place]

## 2023-06-23 ENCOUNTER — OUTSIDE PLACE OF SERVICE (OUTPATIENT)
Dept: SURGERY | Facility: CLINIC | Age: 58
End: 2023-06-23
Payer: MEDICAID

## 2023-06-23 PROCEDURE — G0121 COLON CA SCRN NOT HI RSK IND: HCPCS | Mod: ,,, | Performed by: SURGERY

## 2023-06-23 PROCEDURE — G0121 COLON CA SCRN NOT HI RSK IND: ICD-10-PCS | Mod: ,,, | Performed by: SURGERY

## 2023-07-24 ENCOUNTER — PATIENT MESSAGE (OUTPATIENT)
Dept: RESEARCH | Facility: HOSPITAL | Age: 58
End: 2023-07-24
Payer: MEDICAID

## 2023-07-31 ENCOUNTER — PATIENT MESSAGE (OUTPATIENT)
Dept: RESEARCH | Facility: HOSPITAL | Age: 58
End: 2023-07-31
Payer: MEDICAID

## 2023-08-03 ENCOUNTER — PATIENT OUTREACH (OUTPATIENT)
Dept: ADMINISTRATIVE | Facility: HOSPITAL | Age: 58
End: 2023-08-03
Payer: MEDICAID

## 2023-08-03 LAB — CRC RECOMMENDATION EXT: NORMAL

## 2023-08-19 DIAGNOSIS — J02.9 SORE THROAT: ICD-10-CM

## 2023-08-21 RX ORDER — FLUTICASONE PROPIONATE 50 MCG
SPRAY, SUSPENSION (ML) NASAL
Qty: 16 ML | Refills: 0 | Status: SHIPPED | OUTPATIENT
Start: 2023-08-21 | End: 2023-10-27

## 2023-09-01 DIAGNOSIS — M54.16 LUMBAR RADICULOPATHY, CHRONIC: ICD-10-CM

## 2023-09-05 RX ORDER — LIDOCAINE 50 MG/G
1 PATCH TOPICAL DAILY
Qty: 30 PATCH | Refills: 0 | Status: SHIPPED | OUTPATIENT
Start: 2023-09-05 | End: 2023-12-12

## 2023-09-18 ENCOUNTER — OFFICE VISIT (OUTPATIENT)
Dept: PULMONOLOGY | Facility: CLINIC | Age: 58
End: 2023-09-18
Payer: MEDICAID

## 2023-09-18 VITALS
RESPIRATION RATE: 14 BRPM | DIASTOLIC BLOOD PRESSURE: 79 MMHG | HEART RATE: 66 BPM | BODY MASS INDEX: 50.02 KG/M2 | OXYGEN SATURATION: 96 % | HEIGHT: 64 IN | WEIGHT: 293 LBS | SYSTOLIC BLOOD PRESSURE: 129 MMHG

## 2023-09-18 DIAGNOSIS — E66.01 MORBID OBESITY: ICD-10-CM

## 2023-09-18 DIAGNOSIS — G47.33 OSA (OBSTRUCTIVE SLEEP APNEA): ICD-10-CM

## 2023-09-18 DIAGNOSIS — G47.19 EXCESSIVE DAYTIME SLEEPINESS: Primary | ICD-10-CM

## 2023-09-18 PROCEDURE — 3074F PR MOST RECENT SYSTOLIC BLOOD PRESSURE < 130 MM HG: ICD-10-PCS | Mod: CPTII,S$GLB,, | Performed by: INTERNAL MEDICINE

## 2023-09-18 PROCEDURE — 3074F SYST BP LT 130 MM HG: CPT | Mod: CPTII,S$GLB,, | Performed by: INTERNAL MEDICINE

## 2023-09-18 PROCEDURE — 99204 OFFICE O/P NEW MOD 45 MIN: CPT | Mod: S$GLB,,, | Performed by: INTERNAL MEDICINE

## 2023-09-18 PROCEDURE — 1159F MED LIST DOCD IN RCRD: CPT | Mod: CPTII,S$GLB,, | Performed by: INTERNAL MEDICINE

## 2023-09-18 PROCEDURE — 3008F BODY MASS INDEX DOCD: CPT | Mod: CPTII,S$GLB,, | Performed by: INTERNAL MEDICINE

## 2023-09-18 PROCEDURE — 3078F PR MOST RECENT DIASTOLIC BLOOD PRESSURE < 80 MM HG: ICD-10-PCS | Mod: CPTII,S$GLB,, | Performed by: INTERNAL MEDICINE

## 2023-09-18 PROCEDURE — 1159F PR MEDICATION LIST DOCUMENTED IN MEDICAL RECORD: ICD-10-PCS | Mod: CPTII,S$GLB,, | Performed by: INTERNAL MEDICINE

## 2023-09-18 PROCEDURE — 99204 PR OFFICE/OUTPT VISIT, NEW, LEVL IV, 45-59 MIN: ICD-10-PCS | Mod: S$GLB,,, | Performed by: INTERNAL MEDICINE

## 2023-09-18 PROCEDURE — 3078F DIAST BP <80 MM HG: CPT | Mod: CPTII,S$GLB,, | Performed by: INTERNAL MEDICINE

## 2023-09-18 PROCEDURE — 3008F PR BODY MASS INDEX (BMI) DOCUMENTED: ICD-10-PCS | Mod: CPTII,S$GLB,, | Performed by: INTERNAL MEDICINE

## 2023-09-18 RX ORDER — LUMATEPERONE 10.5 MG/1
CAPSULE ORAL
COMMUNITY
Start: 2023-09-11

## 2023-09-18 RX ORDER — AMITRIPTYLINE HYDROCHLORIDE 10 MG/1
TABLET, FILM COATED ORAL
COMMUNITY
Start: 2023-09-07 | End: 2024-03-07

## 2023-09-18 NOTE — PROGRESS NOTES
Pulmonary Medicine Clinic Note    Patient Identification:   Patient ID: Elvi Foote is a 57 y.o. female.    Referring Provider:  Dr. Angie Long     Chief Complaint:  Obstructive sleep apnea    History of Present Illness:    Elvi Foote is a 57 y.o. female with history of pseudotumor cerebri, lumbar radiculopathy with recent steroid injection for back pain, major depression with recent medication changes who presents for the management of newly diagnosed obstructive sleep apnea.  Patient has underlying depression as well as chronic pain.  The medications she has been on in the past had been causing increased daytime fatigue and somnolence.  At some point it was thought that a comorbid obstructive sleep apnea needs to be ruled out.  Patient does have a BMI of 50.4 kilos per m2 and typical symptoms of obstructive sleep apnea.  She underwent a diagnostic polysomnography which revealed mild obstructive sleep apnea with an AHI around 8 but patient had significant REM AHI of around 38.  She was prescribed an auto titrating CPAP which she just got a week ago.  Initially she had trouble using the machine but for the last 2 3 nights she has used it with better compliance.  He has not noted much difference in terms of her symptoms.  Today's [place] Sleepiness scale score is 10.  There has been some recent changes in her depression medications.    Her  reports improvement in her snoring since she started to use machine.    Review of Systems:  Review of Systems   Constitutional:  Positive for fatigue. Negative for fever, chills, weight loss, weight gain, activity change, appetite change, night sweats and weakness.   HENT:  Negative for nosebleeds, postnasal drip, rhinorrhea, sinus pressure, sore throat, trouble swallowing, voice change, congestion, ear pain and hearing loss.    Eyes:  Negative for redness and itching.   Respiratory:  Positive for somnolence. Negative for cough, hemoptysis, sputum production,  choking, chest tightness, shortness of breath, wheezing, orthopnea, previous hospitialization due to pulmonary problems, asthma nighttime symptoms, pleurisy, dyspnea on extertion, use of rescue inhaler and Paroxysmal Nocturnal Dyspnea.    Cardiovascular:  Negative for chest pain, palpitations and leg swelling.   Genitourinary:  Negative for difficulty urinating and hematuria.   Endocrine:  Negative for polydipsia, polyphagia, cold intolerance, heat intolerance and polyuria.    Musculoskeletal:  Negative for arthralgias, back pain, gait problem, joint swelling and myalgias.   Skin:  Negative for rash.   Gastrointestinal:  Negative for nausea, vomiting, abdominal pain, abdominal distention and acid reflux.   Neurological:  Negative for dizziness, syncope, weakness, light-headedness and headaches.   Hematological:  Negative for adenopathy. Does not bruise/bleed easily and no excessive bruising.   Psychiatric/Behavioral:  Negative for confusion and sleep disturbance. The patient is not nervous/anxious.          Allergies: Review of patient's allergies indicates:  No Known Allergies    Medications:      Past Medical History:      Past Medical History:   Diagnosis Date    Anxiety     Arthritis     Knee pain     RIGHT     Migraines     Personal history of colonic polyps     Plantar fasciitis     Pseudotumor        Family History:      Family History   Problem Relation Age of Onset    Hypertension Mother     Arthritis Mother     Cancer Mother     Hypertension Father     Colon cancer Father 60    Arthritis Paternal Grandmother         Social History:      Past Surgical History:   Procedure Laterality Date    CHOLECYSTECTOMY      COLONOSCOPY      TONSILLECTOMY         Physical Exam:  Vitals:    09/18/23 1332   BP: 129/79   Pulse: 66   Resp: 14     Physical Exam   Constitutional: She is oriented to person, place, and time. She appears not cachectic. No distress. She is obese.   HENT:   Head: Normocephalic.   Right Ear:  External ear normal.   Left Ear: External ear normal.   Nose: Nose normal. No mucosal edema. No polyps.   Mouth/Throat: Oropharynx is clear and moist. Normal dentition. No oropharyngeal exudate. Mallampati Score: II.   Neck: No JVD present. No tracheal deviation present. No thyromegaly present.   Cardiovascular: Normal rate, regular rhythm, normal heart sounds and intact distal pulses. Exam reveals no gallop and no friction rub.   No murmur heard.  Pulmonary/Chest: Normal expansion, symmetric chest wall expansion, effort normal and breath sounds normal. No stridor. No respiratory distress. She has no decreased breath sounds. She has no wheezes. She has no rhonchi. She has no rales. Chest wall is not dull to percussion. She exhibits no tenderness. Negative for egophony. Negative for tactile fremitus.   Abdominal: Soft. Bowel sounds are normal. She exhibits no distension and no mass. There is no hepatosplenomegaly. There is no abdominal tenderness. There is no rebound and no guarding. No hernia.   Musculoskeletal:         General: No tenderness, deformity or edema. Normal range of motion.      Cervical back: Normal range of motion and neck supple.   Lymphadenopathy: No supraclavicular adenopathy is present.     She has no cervical adenopathy.     She has no axillary adenopathy.   Neurological: She is alert and oriented to person, place, and time. She has normal reflexes. She displays normal reflexes. No cranial nerve deficit. She exhibits normal muscle tone.   Skin: Skin is warm and dry. No rash noted. She is not diaphoretic. No cyanosis or erythema. No pallor. Nails show no clubbing.   Psychiatric: She has a normal mood and affect. Her behavior is normal. Judgment and thought content normal.         Accessory Clinical Data:  Previous Sleep Study findings:  Mild obstructive sleep apnea which is REM predominant    ESS of 10    [facility]        Assessment and Plan:    Problem List Items Addressed This Visit     None  Visit Diagnoses       Excessive daytime sleepiness    -  Primary    CORINA (obstructive sleep apnea)        Morbid obesity                 Encouraged her to continue to use her CPAP on daily basis.  I suspect part of her fatigue and daytime sleepiness is medications/pain related as well.  Explained the does response relationship and the importance of compliance.  Currently, the pressure and interface which is nasal pillow seems to be working okay.  We will need to review the compliance data in about 4 weeks to determine efficacy of therapy.  She is currently on amitriptyline, fluoxetine, Caplyta and Seroquel.  She mentions that her depression symptoms control is up and down.  Advised losing weight with dietary and exercise modalities as this may help overall health status in addition to improving sleep quality and sleep disordered breathing severity.  More than 50% of 45 minutes time was spent face-to-face on counseling, in reviewing referring physicians notes, performing appropriate examination including risk stratification, assessing daytime sleepiness, risk for sleep disordered breathing, counseling and educating the patient regarding the pathophysiology of obstructive sleep apnea, available treatment modalities, importance of compliance, techniques of desensitization etc., documenting clinical information in the electronic medical record and care coordination.      Follow up in about 4 weeks (around 10/16/2023) for  CPAP compliance.  Thank you very much for involving me in the care of this patient.  Please do not hesitate to reach me if you have any further questions or concerns.

## 2023-10-16 ENCOUNTER — TELEPHONE (OUTPATIENT)
Dept: PULMONOLOGY | Facility: CLINIC | Age: 58
End: 2023-10-16
Payer: MEDICAID

## 2023-10-30 ENCOUNTER — OFFICE VISIT (OUTPATIENT)
Dept: PULMONOLOGY | Facility: CLINIC | Age: 58
End: 2023-10-30
Payer: MEDICAID

## 2023-10-30 VITALS
OXYGEN SATURATION: 96 % | SYSTOLIC BLOOD PRESSURE: 146 MMHG | HEIGHT: 64 IN | BODY MASS INDEX: 49.28 KG/M2 | DIASTOLIC BLOOD PRESSURE: 88 MMHG | WEIGHT: 288.63 LBS | HEART RATE: 55 BPM | RESPIRATION RATE: 18 BRPM

## 2023-10-30 DIAGNOSIS — G47.33 OBSTRUCTIVE SLEEP APNEA: Primary | ICD-10-CM

## 2023-10-30 PROCEDURE — 3079F PR MOST RECENT DIASTOLIC BLOOD PRESSURE 80-89 MM HG: ICD-10-PCS | Mod: CPTII,S$GLB,, | Performed by: INTERNAL MEDICINE

## 2023-10-30 PROCEDURE — 1160F RVW MEDS BY RX/DR IN RCRD: CPT | Mod: CPTII,S$GLB,, | Performed by: INTERNAL MEDICINE

## 2023-10-30 PROCEDURE — 3008F BODY MASS INDEX DOCD: CPT | Mod: CPTII,S$GLB,, | Performed by: INTERNAL MEDICINE

## 2023-10-30 PROCEDURE — 1159F MED LIST DOCD IN RCRD: CPT | Mod: CPTII,S$GLB,, | Performed by: INTERNAL MEDICINE

## 2023-10-30 PROCEDURE — 3008F PR BODY MASS INDEX (BMI) DOCUMENTED: ICD-10-PCS | Mod: CPTII,S$GLB,, | Performed by: INTERNAL MEDICINE

## 2023-10-30 PROCEDURE — 1160F PR REVIEW ALL MEDS BY PRESCRIBER/CLIN PHARMACIST DOCUMENTED: ICD-10-PCS | Mod: CPTII,S$GLB,, | Performed by: INTERNAL MEDICINE

## 2023-10-30 PROCEDURE — 99214 PR OFFICE/OUTPT VISIT, EST, LEVL IV, 30-39 MIN: ICD-10-PCS | Mod: S$GLB,,, | Performed by: INTERNAL MEDICINE

## 2023-10-30 PROCEDURE — 3079F DIAST BP 80-89 MM HG: CPT | Mod: CPTII,S$GLB,, | Performed by: INTERNAL MEDICINE

## 2023-10-30 PROCEDURE — 1159F PR MEDICATION LIST DOCUMENTED IN MEDICAL RECORD: ICD-10-PCS | Mod: CPTII,S$GLB,, | Performed by: INTERNAL MEDICINE

## 2023-10-30 PROCEDURE — 3077F SYST BP >= 140 MM HG: CPT | Mod: CPTII,S$GLB,, | Performed by: INTERNAL MEDICINE

## 2023-10-30 PROCEDURE — 99214 OFFICE O/P EST MOD 30 MIN: CPT | Mod: S$GLB,,, | Performed by: INTERNAL MEDICINE

## 2023-10-30 PROCEDURE — 3077F PR MOST RECENT SYSTOLIC BLOOD PRESSURE >= 140 MM HG: ICD-10-PCS | Mod: CPTII,S$GLB,, | Performed by: INTERNAL MEDICINE

## 2023-10-30 NOTE — PROGRESS NOTES
Pulmonary Medicine Clinic Note    Patient Identification:   Patient ID: Elvi Foote is a 57 y.o. female.    Referring Provider:  Established patient    Chief Complaint:  Unable to tolerate CPAP    History of Present Illness:    Elvi Foote is a 57 y.o. female who presents with the above issues.    I have compliance report from few days in September, exactly 9 and about 17 days in October.  Review of this report shows a sporadic use, anywhere from few minutes to all the way almost 10 hour usage.  On most of these days her apnea-hypopnea index is well controlled with optimum leak.  On couple of days such as on October 5th and October 16th her AHI is mildly to moderately elevated with increased leak but the usage on 16th was only 3 minutes.  AHI on 5th was 8.5 events per hour with leak on average about 35 L. mean pressure during these days was not significantly elevated.  She mentions that the 1st few days when she started using CPAP she really liked it but after about a week she did not find any significant benefit.  She started to having extremely dry mouth and if she had to use restroom she would not put back the interface or CPAP.  Despite her claim of not getting significant benefit it seems like her [place] Sleepiness scale score is 7 today.  On average her AHI is well controlled on the days she uses her machine and except couple of days her leak seems to be acceptable.  However, there is a question about mouth breathing so a chinstrap was suggested as she can not afford to pay for a fullface mask at this point.  She has not received the chinstrap as of now.  It should also be noted that her sleep apnea is only mild and it is possible that most of her symptoms are not just related to sleep apnea.    Review of Systems:  Review of Systems   Constitutional:  Positive for fatigue. Negative for fever, chills, weight loss, weight gain, activity change, appetite change, night sweats and weakness.   HENT:   Negative for nosebleeds, postnasal drip, rhinorrhea, sinus pressure, sore throat, trouble swallowing, voice change, congestion, ear pain and hearing loss.    Eyes:  Negative for redness and itching.   Respiratory:  Positive for somnolence. Negative for cough, hemoptysis, sputum production, choking, chest tightness, shortness of breath, wheezing, orthopnea, previous hospitialization due to pulmonary problems, asthma nighttime symptoms, pleurisy, dyspnea on extertion, use of rescue inhaler and Paroxysmal Nocturnal Dyspnea.    Cardiovascular:  Negative for chest pain, palpitations and leg swelling.   Genitourinary:  Negative for difficulty urinating and hematuria.   Endocrine:  Negative for polydipsia, polyphagia, cold intolerance, heat intolerance and polyuria.    Musculoskeletal:  Negative for arthralgias, back pain, gait problem, joint swelling and myalgias.   Skin:  Negative for rash.   Gastrointestinal:  Negative for nausea, vomiting, abdominal pain, abdominal distention and acid reflux.   Neurological:  Negative for dizziness, syncope, weakness, light-headedness and headaches.   Hematological:  Negative for adenopathy. Does not bruise/bleed easily and no excessive bruising.   Psychiatric/Behavioral:  Negative for confusion and sleep disturbance. The patient is not nervous/anxious.          Allergies: Review of patient's allergies indicates:  No Known Allergies    Medications:      Past Medical History:      Past Medical History:   Diagnosis Date    Anxiety     Arthritis     Knee pain     RIGHT     Migraines     Personal history of colonic polyps     Plantar fasciitis     Pseudotumor        Family History:      Family History   Problem Relation Age of Onset    Hypertension Mother     Arthritis Mother     Cancer Mother     Hypertension Father     Colon cancer Father 60    Arthritis Paternal Grandmother         Social History:      Past Surgical History:   Procedure Laterality Date    CHOLECYSTECTOMY       COLONOSCOPY      TONSILLECTOMY         Physical Exam:  Vitals:    10/30/23 1152   BP: (!) 146/88   Pulse: (!) 55   Resp: 18     Physical Exam   Constitutional: She is oriented to person, place, and time. She appears not cachectic. No distress. She is obese.   HENT:   Head: Normocephalic.   Right Ear: External ear normal.   Left Ear: External ear normal.   Nose: Nose normal. No mucosal edema. No polyps.   Mouth/Throat: Oropharynx is clear and moist. Normal dentition. No oropharyngeal exudate.   Neck: No JVD present. No tracheal deviation present. No thyromegaly present.   Cardiovascular: Normal rate, regular rhythm, normal heart sounds and intact distal pulses. Exam reveals no gallop and no friction rub.   No murmur heard.  Pulmonary/Chest: Normal expansion, symmetric chest wall expansion, effort normal and breath sounds normal. No stridor. No respiratory distress. She has no decreased breath sounds. She has no wheezes. She has no rhonchi. She has no rales. Chest wall is not dull to percussion. She exhibits no tenderness. Negative for egophony. Negative for tactile fremitus.   Abdominal: Soft. Bowel sounds are normal. She exhibits no distension and no mass. There is no hepatosplenomegaly. There is no abdominal tenderness. There is no rebound and no guarding. No hernia.   Musculoskeletal:         General: No tenderness, deformity or edema. Normal range of motion.      Cervical back: Normal range of motion and neck supple.   Lymphadenopathy: No supraclavicular adenopathy is present.     She has no cervical adenopathy.     She has no axillary adenopathy.   Neurological: She is alert and oriented to person, place, and time. She has normal reflexes. She displays normal reflexes. No cranial nerve deficit. She exhibits normal muscle tone.   Skin: Skin is warm and dry. No rash noted. She is not diaphoretic. No cyanosis or erythema. No pallor. Nails show no clubbing.   Psychiatric: She has a normal mood and affect. Her  behavior is normal. Judgment and thought content normal.         Accessory Clinical Data:  Previous Sleep Study findings:  Mild sleep apnea    Compliance report in September for 9 days shows an average usage of 5 hours and 26 minutes with more than 4 hour usage of 77.8%.  Average P 95 was 8.6 cm of water with average high leak time of 3 minutes, average leak of 12.3 liters/minute and average AHI of 2.2 events per hour.  Compliance report for 17 days in October showed a mean pressure of 6.5 cm of water with average P 95 of 8.5 cm of water.  Highest daily average pressure was 7.5 cm of water.  Average high leak time was 7 minutes which is increased now with average leak of 13.9 liters/minute.  Average AHI was 4.2 events per hour.    ESS of 7 which is improved from last value of 10    Assessment and Plan:    Problem List Items Addressed This Visit    None  Visit Diagnoses       Obstructive sleep apnea    -  Primary           Reassured her that the CPAP machine does control her underlying sleep apnea if she uses it.  We called the DME and found that no chinstrap is being sent.  A chinstrap prescription was given and should be sent to the patient today.  Since she has not used her machine for last several days, she was advised to start using her machine consistently for at least 30 days so we can assess the data properly.  I have told her that it is possible that her mild sleep apnea is not a major contributor to her symptoms.  Despite that, her ESS is better controlled than previously seen.  Underlying anxiety and pain may be contributing to her daytime symptoms.  If he can not be compliant or tolerate CPAP with chinstrap a fullface mask should be used next.  In case of continuous intolerance, she will be referred to an ortho dental surgeon who getting custom fit an oral device after appropriate evaluation for mild sleep apnea.        Follow up in about 3 months (around 1/17/2024) for With 30 days of compliance report.

## 2023-11-08 ENCOUNTER — OFFICE VISIT (OUTPATIENT)
Dept: PRIMARY CARE CLINIC | Facility: CLINIC | Age: 58
End: 2023-11-08
Payer: MEDICAID

## 2023-11-08 VITALS
HEIGHT: 64 IN | OXYGEN SATURATION: 98 % | HEART RATE: 65 BPM | SYSTOLIC BLOOD PRESSURE: 126 MMHG | BODY MASS INDEX: 48.52 KG/M2 | DIASTOLIC BLOOD PRESSURE: 83 MMHG | WEIGHT: 284.19 LBS

## 2023-11-08 DIAGNOSIS — E66.01 CLASS 3 SEVERE OBESITY WITHOUT SERIOUS COMORBIDITY WITH BODY MASS INDEX (BMI) OF 50.0 TO 59.9 IN ADULT, UNSPECIFIED OBESITY TYPE: ICD-10-CM

## 2023-11-08 DIAGNOSIS — Z00.00 WELLNESS EXAMINATION: ICD-10-CM

## 2023-11-08 DIAGNOSIS — Z12.39 ENCOUNTER FOR SCREENING FOR MALIGNANT NEOPLASM OF BREAST, UNSPECIFIED SCREENING MODALITY: ICD-10-CM

## 2023-11-08 DIAGNOSIS — Z11.59 NEED FOR HEPATITIS C SCREENING TEST: Primary | ICD-10-CM

## 2023-11-08 PROCEDURE — 3008F PR BODY MASS INDEX (BMI) DOCUMENTED: ICD-10-PCS | Mod: CPTII,S$GLB,, | Performed by: INTERNAL MEDICINE

## 2023-11-08 PROCEDURE — 1159F MED LIST DOCD IN RCRD: CPT | Mod: CPTII,S$GLB,, | Performed by: INTERNAL MEDICINE

## 2023-11-08 PROCEDURE — 99396 PREV VISIT EST AGE 40-64: CPT | Mod: S$GLB,,, | Performed by: INTERNAL MEDICINE

## 2023-11-08 PROCEDURE — 3079F PR MOST RECENT DIASTOLIC BLOOD PRESSURE 80-89 MM HG: ICD-10-PCS | Mod: CPTII,S$GLB,, | Performed by: INTERNAL MEDICINE

## 2023-11-08 PROCEDURE — 1159F PR MEDICATION LIST DOCUMENTED IN MEDICAL RECORD: ICD-10-PCS | Mod: CPTII,S$GLB,, | Performed by: INTERNAL MEDICINE

## 2023-11-08 PROCEDURE — 3079F DIAST BP 80-89 MM HG: CPT | Mod: CPTII,S$GLB,, | Performed by: INTERNAL MEDICINE

## 2023-11-08 PROCEDURE — 99396 PR PREVENTIVE VISIT,EST,40-64: ICD-10-PCS | Mod: S$GLB,,, | Performed by: INTERNAL MEDICINE

## 2023-11-08 PROCEDURE — 3074F PR MOST RECENT SYSTOLIC BLOOD PRESSURE < 130 MM HG: ICD-10-PCS | Mod: CPTII,S$GLB,, | Performed by: INTERNAL MEDICINE

## 2023-11-08 PROCEDURE — 3074F SYST BP LT 130 MM HG: CPT | Mod: CPTII,S$GLB,, | Performed by: INTERNAL MEDICINE

## 2023-11-08 PROCEDURE — 3008F BODY MASS INDEX DOCD: CPT | Mod: CPTII,S$GLB,, | Performed by: INTERNAL MEDICINE

## 2023-11-08 NOTE — PROGRESS NOTES
Subjective:      Patient ID: Elvi Foote is a 57 y.o. female.    Chief Complaint: Follow-up (Patient is here for a follow up visit )    HPI      Patient here for follow up     I reviewed her old progress notes from 2016 where it reports a h/o pseudotumor cerebri for which she was on diamox and headaches were controlled. She has been to the ophthalmologist and was told she has the beginnings of cataracts but she was upset because apparently nobody told her that's what she had.   She was told she has no papilledema and in old progress notes from [facility] she failed 2 lumbar punctures because of pain and body habitus.   She was referred to neurology and was denied an appointment because they states they don't treat pseudotumor in her age group (per patient) so she was then referred to Dr Rosen who started her on tegretol and diamox as well as amantadine      She was also referred to Dr Bee for lower back pain and she was then referred to Dr Garcia for LP and opening pressure was documented as 40 mmHg  She got a back injection by Dr Vera several times     Goes to [facility] for depression and anxiety and states she was diagnosed with bipolar disease     She reported numbness and tingling in her extremities and she has had an EMG in the past and was told she  has carpal tunnel but states nobody told her that     She also reported lower back pain, hip pain and knee pain and received knee and hip injections by Dr Ward  which helped somewhat.       No longer seeing the nutiritonist      She was told to get a sleep study which she did and a CPAP was ordered but apparently it doesn't fit very well and she needs a chin strap    She states she is filing for disability       Review of Systems   Constitutional:  Negative for chills and fever.   HENT:  Negative for hearing loss.    Eyes:  Negative for blurred vision.   Respiratory:  Negative for cough, shortness of breath and wheezing.   "  Cardiovascular:  Negative for chest pain, palpitations and leg swelling.   Gastrointestinal:  Negative for abdominal pain, blood in stool, constipation, diarrhea, melena, nausea and vomiting.   Genitourinary:  Negative for dysuria, frequency and urgency.   Musculoskeletal:  Positive for back pain and joint pain. Negative for falls.        Chronic   Skin:  Negative for rash.   Neurological:  Positive for headaches. Negative for dizziness.   Endo/Heme/Allergies:  Does not bruise/bleed easily.   Psychiatric/Behavioral:  Positive for depression. Negative for suicidal ideas. The patient is nervous/anxious.      Objective:     Physical Exam  Vitals reviewed.   Constitutional:       Appearance: Normal appearance.   HENT:      Head: Normocephalic.      Mouth/Throat:      Mouth: Mucous membranes are moist.      Pharynx: Oropharynx is clear.   Eyes:      Extraocular Movements: Extraocular movements intact.      Conjunctiva/sclera: Conjunctivae normal.      Pupils: Pupils are equal, round, and reactive to light.   Cardiovascular:      Rate and Rhythm: Normal rate and regular rhythm.   Pulmonary:      Effort: Pulmonary effort is normal.      Breath sounds: Normal breath sounds.   Abdominal:      General: Bowel sounds are normal.   Musculoskeletal:      Right lower leg: No edema.      Left lower leg: No edema.   Skin:     General: Skin is warm.      Capillary Refill: Capillary refill takes less than 2 seconds.   Neurological:      General: No focal deficit present.      Mental Status: She is alert.   Psychiatric:         Mood and Affect: Mood normal.       /83 (BP Location: Left arm, Patient Position: Sitting)   Pulse 65   Ht 5' 4" (1.626 m)   Wt 128.9 kg (284 lb 3.2 oz)   SpO2 98%   BMI 48.78 kg/m²     Assessment:       ICD-10-CM ICD-9-CM   1. Wellness examination  Z00.00 V70.0   2. Encounter for screening for malignant neoplasm of breast, unspecified screening modality  Z12.39 V76.10   3. Class 3 severe obesity " without serious comorbidity with body mass index (BMI) of 50.0 to 59.9 in adult, unspecified obesity type  E66.01 278.01    Z68.43 V85.43       Plan:     Medication List with Changes/Refills   Current Medications    ACETAZOLAMIDE (DIAMOX) 250 MG TABLET    Take by mouth. TAKE 1 TO 2 TABS TID DAILY    ALUMINUM-MAGNESIUM HYDROXIDE 200-200 MG/5 ML SUSPENSION    Take 30 mLs by mouth every 6 (six) hours as needed for Indigestion.    AMANTADINE HCL (SYMMETREL) 100 MG CAPSULE    Take 100 mg by mouth once daily.    AMITRIPTYLINE (ELAVIL) 10 MG TABLET        CAPLYTA 10.5 MG CAP        CARBAMAZEPINE (TEGRETOL) 200 MG TABLET    TAKE 1/2 TAB  TO 1 TAB PO 4 TIMES A DAY FOR NERVE PAIN.    FLUOXETINE 20 MG CAPSULE    Take by mouth. TAKE 1 TO 2 TABS QD IN THE AM    FLUTICASONE PROPIONATE (FLONASE) 50 MCG/ACTUATION NASAL SPRAY    SPRAY 1 SPRAY (50 MCG TOTAL) INTO INTO EACH NOSTRIL EVERY DAY    HYOSCYAMINE (LEVSIN) 0.125 MG/5 ML ELIX    Take 10 mLs (250 mcg total) by mouth every 6 (six) hours as needed (esophageal spasms/throat pain). Mixture of Viscous lidocaine 10 ml + Maalox 30 ml + Hyoscyamine 10 ml  taken as a single dose (50ml) every 6 hours prn.    INDOMETHACIN (INDOCIN) 50 MG CAPSULE    Take 50 mg by mouth 3 (three) times daily as needed.    LIDOCAINE (LIDODERM) 5 %    Place 1 patch onto the skin every 24 hours. Remove & Discard patch within 12 hours or as directed by MD    LIDOCAINE (LIDODERM) 5 %    Place 1 patch onto the skin once daily. Remove & Discard patch within 12 hours or as directed by MD    LIDOCAINE 4 % SPRA    Apply topically. INHALE 1-2 SPRAYS IN THE NOSTRIL EVERY 2 HRS PRN    LIDOCAINE HCL 2% (LIDOCAINE VISCOUS) 2 % SOLN    10 ml viscous lidocaine + maalox 30 ml + hyoscyamine 10 ml taken as a single dose (50 ml) every 6 hours prn    MEDROXYPROGESTERONE (PROVERA) 10 MG TABLET    Take 1 tablet (10 mg total) by mouth once daily. for 12 doses    OXYBUTYNIN (DITROPAN) 5 MG TAB    Take 5 mg by mouth 3 (three) times  daily.    PANTOPRAZOLE (PROTONIX) 40 MG TABLET    Take 1 tablet (40 mg total) by mouth once daily.    QUETIAPINE (SEROQUEL) 50 MG TABLET    Take 50 mg by mouth nightly.    SOD SULF-POT CHLORIDE-MAG SULF (SUTAB) 1.479-0.188- 0.225 GRAM TABLET    Take 12 tablets by mouth once daily. Take according to package instructions with indicated amount of water.        1. Wellness examination  -     CBC Auto Differential; Future; Expected date: 11/08/2023  -     Comprehensive Metabolic Panel; Future; Expected date: 11/08/2023  -     Lipid Panel; Future; Expected date: 11/08/2023    2. Encounter for screening for malignant neoplasm of breast, unspecified screening modality  -     Mammo Digital Screening Bilat; Future; Expected date: 11/08/2023    3. Class 3 severe obesity without serious comorbidity with body mass index (BMI) of 50.0 to 59.9 in adult, unspecified obesity type           Future Appointments   Date Time Provider Department Center   1/24/2024  1:30 PM Brie Fernández MD [facility] [place]

## 2023-11-10 DIAGNOSIS — J02.9 SORE THROAT: ICD-10-CM

## 2023-11-13 RX ORDER — FLUTICASONE PROPIONATE 50 MCG
1 SPRAY, SUSPENSION (ML) NASAL
Qty: 48 ML | Refills: 1 | Status: SHIPPED | OUTPATIENT
Start: 2023-11-13

## 2023-12-12 DIAGNOSIS — M54.16 LUMBAR RADICULOPATHY, CHRONIC: ICD-10-CM

## 2023-12-12 RX ORDER — LIDOCAINE 50 MG/G
1 PATCH TOPICAL DAILY
Qty: 30 PATCH | Refills: 0 | Status: SHIPPED | OUTPATIENT
Start: 2023-12-12

## 2024-02-29 ENCOUNTER — PATIENT MESSAGE (OUTPATIENT)
Dept: PRIMARY CARE CLINIC | Facility: CLINIC | Age: 59
End: 2024-02-29
Payer: MEDICAID

## 2024-03-07 ENCOUNTER — OFFICE VISIT (OUTPATIENT)
Dept: URGENT CARE | Facility: CLINIC | Age: 59
End: 2024-03-07
Payer: MEDICAID

## 2024-03-07 VITALS
SYSTOLIC BLOOD PRESSURE: 156 MMHG | BODY MASS INDEX: 50.02 KG/M2 | TEMPERATURE: 99 F | RESPIRATION RATE: 18 BRPM | DIASTOLIC BLOOD PRESSURE: 94 MMHG | HEIGHT: 64 IN | WEIGHT: 293 LBS | OXYGEN SATURATION: 97 % | HEART RATE: 67 BPM

## 2024-03-07 DIAGNOSIS — R10.9 FLANK PAIN: ICD-10-CM

## 2024-03-07 DIAGNOSIS — M43.16 ANTEROLISTHESIS OF LUMBAR SPINE: Primary | ICD-10-CM

## 2024-03-07 DIAGNOSIS — M51.36 NARROWING OF LUMBAR INTERVERTEBRAL DISC SPACE: ICD-10-CM

## 2024-03-07 DIAGNOSIS — M54.50 ACUTE LEFT-SIDED LOW BACK PAIN WITHOUT SCIATICA: ICD-10-CM

## 2024-03-07 LAB
BILIRUB UR QL STRIP: NEGATIVE
GLUCOSE UR QL STRIP: NEGATIVE
KETONES UR QL STRIP: NEGATIVE
LEUKOCYTE ESTERASE UR QL STRIP: NEGATIVE
PH, POC UA: 6.5
POC BLOOD, URINE: NEGATIVE
POC NITRATES, URINE: NEGATIVE
PROT UR QL STRIP: NEGATIVE
SP GR UR STRIP: 1.02 (ref 1–1.03)
UROBILINOGEN UR STRIP-ACNC: NORMAL (ref 0.1–1.1)

## 2024-03-07 PROCEDURE — 99214 OFFICE O/P EST MOD 30 MIN: CPT | Mod: S$GLB,,, | Performed by: NURSE PRACTITIONER

## 2024-03-07 PROCEDURE — 72100 X-RAY EXAM L-S SPINE 2/3 VWS: CPT | Mod: TC,,, | Performed by: NURSE PRACTITIONER

## 2024-03-07 PROCEDURE — 81003 URINALYSIS AUTO W/O SCOPE: CPT | Mod: QW,S$GLB,, | Performed by: NURSE PRACTITIONER

## 2024-03-07 PROCEDURE — 72100 X-RAY EXAM L-S SPINE 2/3 VWS: CPT | Mod: 26,,, | Performed by: RADIOLOGY

## 2024-03-07 RX ORDER — KETOROLAC TROMETHAMINE 30 MG/ML
30 INJECTION, SOLUTION INTRAMUSCULAR; INTRAVENOUS
Status: COMPLETED | OUTPATIENT
Start: 2024-03-07 | End: 2024-03-07

## 2024-03-07 RX ORDER — TRAMADOL HYDROCHLORIDE 50 MG/1
50 TABLET ORAL EVERY 6 HOURS PRN
Qty: 20 TABLET | Refills: 0 | Status: SHIPPED | OUTPATIENT
Start: 2024-03-07

## 2024-03-07 RX ORDER — METHOCARBAMOL 750 MG/1
TABLET, FILM COATED ORAL
COMMUNITY
Start: 2023-11-30

## 2024-03-07 RX ADMIN — KETOROLAC TROMETHAMINE 30 MG: 30 INJECTION, SOLUTION INTRAMUSCULAR; INTRAVENOUS at 01:03

## 2024-03-07 NOTE — PROGRESS NOTES
"Subjective:      Patient ID: Elvi Foote is a 58 y.o. female.    Vitals:  height is 5' 4" (1.626 m) and weight is 138.3 kg (305 lb) (abnormal). Her oral temperature is 98.5 °F (36.9 °C). Her blood pressure is 156/94 (abnormal) and her pulse is 67. Her respiration is 18 and oxygen saturation is 97%.     Chief Complaint: Back Pain (Back pain starting radiating to the left during the night last night)    Pt presents with c/o non-radiating lower left back pain which does not feel typical of her prior "arthritic" type back pain. States acute pain onset was while at rest last night. Denies known recent injury. She does recall urinary frequency/urgency last week but denies currently. No dysuria or hematuria. No prior h/o renal stones.  Took muscle relaxer this morning which was minimally effective.  Denies lower extremity weakness, numbness, or tingling. No provoking or alleviating factors reported.      Back Pain  This is a new problem. The current episode started today. The problem occurs constantly. The problem has been gradually worsening since onset. The quality of the pain is described as aching. The pain is at a severity of 8/10. The pain is moderate. The pain is The same all the time. Stiffness is present All day. Pertinent negatives include no abdominal pain, dysuria, fever, numbness or pelvic pain. She has tried muscle relaxant for the symptoms. The treatment provided mild relief.       Constitution: Negative for activity change, appetite change, chills, sweating, fatigue and fever.   Gastrointestinal:  Negative for abdominal pain, nausea, vomiting and diarrhea.   Genitourinary:  Positive for flank pain. Negative for dysuria, frequency, urgency, urine decreased, hematuria, history of kidney stones and pelvic pain.   Musculoskeletal:  Positive for pain, arthritis, back pain, muscle ache and history of spine disorder. Negative for trauma and abnormal ROM of joint.        H/o lumbar degenerative disc disease   "   Skin:  Negative for rash, wound and abrasion.   Neurological:  Negative for loss of balance, disorientation, altered mental status, numbness and tingling.   Psychiatric/Behavioral:  Negative for altered mental status, disorientation, confusion and agitation.       Objective:     Physical Exam   Constitutional: She is oriented to person, place, and time.  Non-toxic appearance. She does not appear ill. No distress.      Comments:Appears to be in a moderate amount of discomfort     Cardiovascular: Normal rate.   Pulmonary/Chest: Effort normal.   Abdominal: Normal appearance. There is no left CVA tenderness and no right CVA tenderness.   Musculoskeletal:         General: Tenderness present.      Lumbar back: She exhibits tenderness, bony tenderness and spasm. She exhibits normal range of motion and no deformity.        Back:       Comments: + lumbar midline tenderness to palpation with more intense pain to palpation over the left paraspinous muscles; +palpable spasm.  No saddle anesthesia. No sensory or motor deficit to LLE.   Neurological: no focal deficit. She is alert, oriented to person, place, and time and at baseline. She displays no weakness. No sensory deficit. Gait normal.   Skin: Skin is warm, dry, not diaphoretic and no rash. No bruising   Psychiatric: Her behavior is normal. Mood, judgment and thought content normal.   Nursing note and vitals reviewed.      Assessment:     1. Anterolisthesis of lumbar spine    2. Narrowing of lumbar intervertebral disc space    3. Flank pain    4. Acute left-sided low back pain without sciatica        Plan:     Office Visit on 03/07/2024   Component Date Value Ref Range Status    POC Blood, Urine 03/07/2024 Negative  Negative Final    POC Bilirubin, Urine 03/07/2024 Negative  Negative Final    POC Urobilinogen, Urine 03/07/2024 Normal  0.1 - 1.1 Final    POC Ketones, Urine 03/07/2024 Negative  Negative Final    POC Protein, Urine 03/07/2024 Negative  Negative Final    POC  Nitrates, Urine 03/07/2024 Negative  Negative Final    POC Glucose, Urine 03/07/2024 Negative  Negative Final    pH, UA 03/07/2024 6.5   Final    POC Specific Gravity, Urine 03/07/2024 1.020  1.003 - 1.029 Final    POC Leukocytes, Urine 03/07/2024 Negative  Negative Final     XR LUMBAR SPINE 2 OR 3 VIEWS    Result Date: 3/7/2024  EXAM: XR LUMBAR SPINE 2 OR 3 VIEWS CLINICAL HISTORY: Low back pain FINDINGS:  3 views. Levoscoliosis at the thoracolumbar junction.  No fractures or dislocations.  Multilevel degenerative disc changes of the lower thoracic spine and lumbar spine. Anterolisthesis at L4-5 with moderate to severe disc space narrowing.  This measures 7 mm.     Scoliosis and degenerative changes as described above. Finalized on: 3/7/2024 2:03 PM By:  Félix Stevenson MD [facility]# [id number]      2024-03-07 14:06:[id number]    NEALG             Anterolisthesis of lumbar spine  -     Ambulatory referral/consult to Pain Clinic  -     traMADoL (ULTRAM) 50 mg tablet; Take 1 tablet (50 mg total) by mouth every 6 (six) hours as needed for Pain.  Dispense: 20 tablet; Refill: 0    Narrowing of lumbar intervertebral disc space  -     Ambulatory referral/consult to Pain Clinic  -     traMADoL (ULTRAM) 50 mg tablet; Take 1 tablet (50 mg total) by mouth every 6 (six) hours as needed for Pain.  Dispense: 20 tablet; Refill: 0    Flank pain  -     POCT Urinalysis, Dipstick, Automated, W/O Scope    Acute left-sided low back pain without sciatica  -     XR LUMBAR SPINE 2 OR 3 VIEWS; Future; Expected date: 03/07/2024  -     ketorolac injection 30 mg  -     Ambulatory referral/consult to Pain Clinic  -     traMADoL (ULTRAM) 50 mg tablet; Take 1 tablet (50 mg total) by mouth every 6 (six) hours as needed for Pain.  Dispense: 20 tablet; Refill: 0          Medical Decision Making:   Clinical Tests:   Lab Tests: Reviewed  The following lab test(s) were unremarkable: Urinalysis       <> Summary of Lab: U/a: unremarkable  Radiological Study:  Reviewed  Urgent Care Management:  L Spine XR: Levoscoliosis at the thoracolumbar junction.  No fractures or dislocations.  Multilevel degenerative disc changes of the lower thoracic spine and lumbar spine. Anterolisthesis at L4-5 with moderate to severe disc space narrowing.  This measures 7 mm.     Toradol provided partial/temporary pain relief per pt.  Discussed radiologic findings with pt who denies prior knowledge of anterolisthesis and disc space narrowing.  Plan to place referral to Dr SUJATA Devine for evaluation and management.  Discussed treatment options with patient. Patient expressed verbal understanding and agreement with treatment plan.             Patient Instructions   Please follow up with your primary care provider within 2-5 days if your signs and symptoms have not resolved or worsen.     If your condition worsens or fails to improve we recommend that you receive another evaluation at the emergency room immediately or contact your primary medical clinic to discuss your concerns.   You must understand that you have received an Urgent Care treatment only and that you may be released before all of your medical problems are known or treated. You, the patient, will arrange for follow up care as instructed.     Avoid strenuous activity and heavy lifting.  I have placed a referral to Dr Letty Devine on your behalf. His office will be calling you to schedule an appointment.   Take your home supply of Robaxin and Indomethacin as prescribed. You may take prescribed pain medication as needed for moderate to severe pain.

## 2024-03-07 NOTE — PATIENT INSTRUCTIONS
Please follow up with your primary care provider within 2-5 days if your signs and symptoms have not resolved or worsen.     If your condition worsens or fails to improve we recommend that you receive another evaluation at the emergency room immediately or contact your primary medical clinic to discuss your concerns.   You must understand that you have received an Urgent Care treatment only and that you may be released before all of your medical problems are known or treated. You, the patient, will arrange for follow up care as instructed.     Avoid strenuous activity and heavy lifting.  I have placed a referral to Dr Letty Devine on your behalf. His office will be calling you to schedule an appointment.   Take your home supply of Robaxin and Indomethacin as prescribed. You may take prescribed pain medication as needed for moderate to severe pain.

## 2024-03-12 ENCOUNTER — DOCUMENTATION ONLY (OUTPATIENT)
Dept: OBSTETRICS AND GYNECOLOGY | Facility: CLINIC | Age: 59
End: 2024-03-12
Payer: MEDICAID

## 2024-06-27 ENCOUNTER — TELEPHONE (OUTPATIENT)
Dept: PRIMARY CARE CLINIC | Facility: CLINIC | Age: 59
End: 2024-06-27

## 2024-06-27 ENCOUNTER — OFFICE VISIT (OUTPATIENT)
Dept: PRIMARY CARE CLINIC | Facility: CLINIC | Age: 59
End: 2024-06-27
Payer: MEDICAID

## 2024-06-27 VITALS
BODY MASS INDEX: 48.64 KG/M2 | DIASTOLIC BLOOD PRESSURE: 88 MMHG | WEIGHT: 284.88 LBS | HEART RATE: 68 BPM | SYSTOLIC BLOOD PRESSURE: 170 MMHG | OXYGEN SATURATION: 97 % | HEIGHT: 64 IN

## 2024-06-27 DIAGNOSIS — E66.01 CLASS 3 SEVERE OBESITY WITHOUT SERIOUS COMORBIDITY WITH BODY MASS INDEX (BMI) OF 50.0 TO 59.9 IN ADULT, UNSPECIFIED OBESITY TYPE: ICD-10-CM

## 2024-06-27 DIAGNOSIS — R51.9 NONINTRACTABLE HEADACHE, UNSPECIFIED CHRONICITY PATTERN, UNSPECIFIED HEADACHE TYPE: ICD-10-CM

## 2024-06-27 DIAGNOSIS — G93.2 PSEUDOTUMOR CEREBRI: Primary | ICD-10-CM

## 2024-06-27 DIAGNOSIS — M25.559 HIP PAIN, UNSPECIFIED LATERALITY: ICD-10-CM

## 2024-06-27 RX ORDER — RIMEGEPANT SULFATE 75 MG/75MG
75 TABLET, ORALLY DISINTEGRATING ORAL ONCE AS NEEDED
Qty: 15 TABLET | Refills: 0 | Status: SHIPPED | OUTPATIENT
Start: 2024-06-27 | End: 2024-06-27

## 2024-06-27 RX ORDER — AMITRIPTYLINE HYDROCHLORIDE 10 MG/1
10 TABLET, FILM COATED ORAL NIGHTLY PRN
COMMUNITY

## 2024-06-27 NOTE — PROGRESS NOTES
"Subjective:      Patient ID: Elvi Foote is a 58 y.o. female.    Chief Complaint: Headache (Increasing in the past two months. "The headache is not super bad." ), Dizziness (Intermittently once a week; increasing now. It occurs in the am or at hs. This week it has been daily at different times. She stopped all her meds for about 3-4 days to see if this was affecting her lightheadedness. ), and Nausea    HPI    Past Medical History:   Diagnosis Date    Anxiety     Arthritis     Knee pain     RIGHT     Migraines     Personal history of colonic polyps     Plantar fasciitis     Pseudotumor          Patient with above medical problems here with report of headache   She was seeing Dr Rosen for pseudotumor and states he does not take her insurance anymore (my office called their office and they told us she was last seen in June 2023 and has not followed up since then)  She denies vertigo but states she feels light headed   She was referred to Dr Fernández for sleep apnea and is no longer using her cpap  Her mental health provider is Rosario Bryant  She is taking tylenol for the headaches   She has a BP cuff at home but is not checking it. She reports having a headache now   No change in weight         Review of Systems   Constitutional:  Negative for chills and fever.   HENT:  Negative for hearing loss.    Eyes:  Negative for blurred vision.   Respiratory:  Negative for cough, shortness of breath and wheezing.    Cardiovascular:  Negative for chest pain, palpitations and leg swelling.   Gastrointestinal:  Negative for abdominal pain, blood in stool, constipation, diarrhea, melena, nausea and vomiting.   Genitourinary:  Negative for dysuria, frequency and urgency.   Musculoskeletal:  Positive for back pain, joint pain and myalgias. Negative for falls.        Chronic   Skin:  Negative for rash.   Neurological:  Positive for dizziness and headaches.   Endo/Heme/Allergies:  Does not bruise/bleed easily. " "  Psychiatric/Behavioral:  Positive for depression. Negative for suicidal ideas. The patient is nervous/anxious.      Objective:     Physical Exam  Vitals reviewed.   Constitutional:       Appearance: Normal appearance.   HENT:      Head: Normocephalic.      Mouth/Throat:      Mouth: Mucous membranes are moist.      Pharynx: Oropharynx is clear.   Eyes:      Extraocular Movements: Extraocular movements intact.      Conjunctiva/sclera: Conjunctivae normal.      Pupils: Pupils are equal, round, and reactive to light.   Cardiovascular:      Rate and Rhythm: Normal rate and regular rhythm.   Pulmonary:      Effort: Pulmonary effort is normal.      Breath sounds: Normal breath sounds.   Abdominal:      General: Bowel sounds are normal.   Musculoskeletal:      Right lower leg: No edema.      Left lower leg: No edema.   Skin:     General: Skin is warm.      Capillary Refill: Capillary refill takes less than 2 seconds.   Neurological:      Mental Status: She is alert and oriented to person, place, and time.   Psychiatric:         Mood and Affect: Mood normal.       BP (!) 170/88 (BP Location: Right arm, Patient Position: Sitting, BP Method: Large (Automatic))   Pulse 68   Ht 5' 4" (1.626 m)   Wt 129.2 kg (284 lb 14.4 oz)   SpO2 97%   BMI 48.90 kg/m²     Assessment:       ICD-10-CM ICD-9-CM   1. Pseudotumor cerebri  G93.2 348.2   2. Nonintractable headache, unspecified chronicity pattern, unspecified headache type  R51.9 784.0   3. Class 3 severe obesity without serious comorbidity with body mass index (BMI) of 50.0 to 59.9 in adult, unspecified obesity type  E66.01 278.01    Z68.43 V85.43   4. Hip pain, unspecified laterality  M25.559 719.45       Plan:     Medication List with Changes/Refills   Current Medications    AMANTADINE HCL (SYMMETREL) 100 MG CAPSULE/TABLET    Take 100 mg by mouth once daily.    AMITRIPTYLINE (ELAVIL) 10 MG TABLET    Take 10 mg by mouth nightly as needed for Insomnia.    FLUTICASONE PROPIONATE " (FLONASE) 50 MCG/ACTUATION NASAL SPRAY    SPRAY 1 SPRAY INTO EACH NOSTRIL EVERY DAY    INDOMETHACIN (INDOCIN) 50 MG CAPSULE    Take 50 mg by mouth 3 (three) times daily as needed. The PATIENT IS ONLY TAKING IT BID    LIDOCAINE 4 % SPRA    Apply topically. INHALE 1-2 SPRAYS IN THE NOSTRIL EVERY 2 HRS PRN    LUMATEPERONE 21 MG CAP    every evening.    OXYBUTYNIN (DITROPAN) 5 MG TAB    Take 5 mg by mouth 3 (three) times daily. TAKING ONLY BID   Discontinued Medications    FLUOXETINE 20 MG CAPSULE    Take by mouth. TAKE 1 TO 2 TABS QD IN THE AM    LIDOCAINE (LIDODERM) 5 %    Place 1 patch onto the skin every 24 hours. Remove & Discard patch within 12 hours or as directed by MD    LIDOCAINE (LIDODERM) 5 %    PLACE 1 PATCH ONTO THE SKIN ONCE DAILY. REMOVE & DISCARD PATCH WITHIN 12 HOURS OR AS DIRECTED BY MD    LIDOCAINE HCL 2% (LIDOCAINE VISCOUS) 2 % SOLN    10 ml viscous lidocaine + maalox 30 ml + hyoscyamine 10 ml taken as a single dose (50 ml) every 6 hours prn    MEDROXYPROGESTERONE (PROVERA) 10 MG TABLET    Take 1 tablet (10 mg total) by mouth once daily. for 12 doses    METHOCARBAMOL (ROBAXIN) 750 MG TAB        PANTOPRAZOLE (PROTONIX) 40 MG TABLET    Take 1 tablet (40 mg total) by mouth once daily.    TRAMADOL (ULTRAM) 50 MG TABLET    Take 1 tablet (50 mg total) by mouth every 6 (six) hours as needed for Pain.        1. Pseudotumor cerebri  -     Ambulatory referral/consult to Neurology; Future; Expected date: 07/05/2024    2. Nonintractable headache, unspecified chronicity pattern, unspecified headache type  -     rimegepant (NURTEC) 75 mg odt; Take 1 tablet (75 mg total) by mouth once as needed for Migraine. Place ODT tablet on the tongue; alternatively the ODT tablet may be placed under the tongue  Dispense: 15 tablet; Refill: 0    3. Class 3 severe obesity without serious comorbidity with body mass index (BMI) of 50.0 to 59.9 in adult, unspecified obesity type    4. Hip pain, unspecified laterality       She has  been referred to orthopedics, neurology and also has a mental health provider. She needs to see her neurologist for any medication changes or further imaging, procedures etc.   I gave her some nurtec samples and advised to check BP at home  Her headaches are likely multifactorial but as I said  since she is established with one she needs to follow up with them       Future Appointments   Date Time Provider Department Center   9/27/2024 10:00 AM Angie Long MD [id number]

## 2024-06-27 NOTE — TELEPHONE ENCOUNTER
PA FOR PT'S REQUEST FOR NURTEC HAS BEEN COMPLETED AND SENT TO HER INS PLAN--NOW WAITING ON A REPLY WHICH CAN TAKE UP TO 72 HRS

## 2024-06-28 ENCOUNTER — TELEPHONE (OUTPATIENT)
Dept: PRIMARY CARE CLINIC | Facility: CLINIC | Age: 59
End: 2024-06-28
Payer: MEDICAID

## 2024-07-02 ENCOUNTER — TELEPHONE (OUTPATIENT)
Dept: PRIMARY CARE CLINIC | Facility: CLINIC | Age: 59
End: 2024-07-02
Payer: MEDICAID

## 2024-09-12 ENCOUNTER — OFFICE VISIT (OUTPATIENT)
Dept: URGENT CARE | Facility: CLINIC | Age: 59
End: 2024-09-12
Payer: MEDICAID

## 2024-09-12 VITALS
SYSTOLIC BLOOD PRESSURE: 169 MMHG | TEMPERATURE: 98 F | BODY MASS INDEX: 48.49 KG/M2 | HEART RATE: 78 BPM | HEIGHT: 64 IN | RESPIRATION RATE: 18 BRPM | WEIGHT: 284 LBS | DIASTOLIC BLOOD PRESSURE: 109 MMHG | OXYGEN SATURATION: 97 %

## 2024-09-12 DIAGNOSIS — R53.83 FATIGUE, UNSPECIFIED TYPE: ICD-10-CM

## 2024-09-12 DIAGNOSIS — S00.31XA NASAL ABRASION, INITIAL ENCOUNTER: ICD-10-CM

## 2024-09-12 DIAGNOSIS — J06.9 UPPER RESPIRATORY TRACT INFECTION, UNSPECIFIED TYPE: Primary | ICD-10-CM

## 2024-09-12 DIAGNOSIS — R03.0 ELEVATED BLOOD PRESSURE READING IN OFFICE WITHOUT DIAGNOSIS OF HYPERTENSION: ICD-10-CM

## 2024-09-12 DIAGNOSIS — R51.9 ACUTE NONINTRACTABLE HEADACHE, UNSPECIFIED HEADACHE TYPE: ICD-10-CM

## 2024-09-12 DIAGNOSIS — R09.89 RUNNY NOSE: ICD-10-CM

## 2024-09-12 LAB
CTP QC/QA: YES
SARS-COV-2 AG RESP QL IA.RAPID: NEGATIVE

## 2024-09-12 PROCEDURE — 99213 OFFICE O/P EST LOW 20 MIN: CPT | Mod: S$GLB,,, | Performed by: STUDENT IN AN ORGANIZED HEALTH CARE EDUCATION/TRAINING PROGRAM

## 2024-09-12 PROCEDURE — 87811 SARS-COV-2 COVID19 W/OPTIC: CPT | Mod: QW,S$GLB,, | Performed by: STUDENT IN AN ORGANIZED HEALTH CARE EDUCATION/TRAINING PROGRAM

## 2024-09-12 RX ORDER — MUPIROCIN 20 MG/G
OINTMENT TOPICAL 3 TIMES DAILY
Qty: 22 G | Refills: 0 | Status: SHIPPED | OUTPATIENT
Start: 2024-09-12 | End: 2024-09-17

## 2024-09-12 RX ORDER — CYCLOBENZAPRINE HCL 10 MG
TABLET ORAL
COMMUNITY
Start: 2024-07-14 | End: 2024-09-16

## 2024-09-12 RX ORDER — CLONAZEPAM 0.5 MG/1
TABLET ORAL
COMMUNITY
Start: 2024-08-13 | End: 2024-09-16

## 2024-09-12 RX ORDER — LUMATEPERONE 42 MG/1
CAPSULE ORAL
COMMUNITY
Start: 2024-08-13

## 2024-09-12 RX ORDER — HYDROXYZINE HYDROCHLORIDE 10 MG/1
TABLET, FILM COATED ORAL
COMMUNITY
Start: 2024-08-13 | End: 2024-09-16

## 2024-09-12 RX ORDER — PREGABALIN 50 MG/1
CAPSULE ORAL
COMMUNITY
Start: 2024-07-11

## 2024-09-12 RX ORDER — KETOROLAC TROMETHAMINE 30 MG/ML
30 INJECTION, SOLUTION INTRAMUSCULAR; INTRAVENOUS
Status: COMPLETED | OUTPATIENT
Start: 2024-09-12 | End: 2024-09-12

## 2024-09-12 RX ADMIN — KETOROLAC TROMETHAMINE 30 MG: 30 INJECTION, SOLUTION INTRAMUSCULAR; INTRAVENOUS at 05:09

## 2024-09-12 NOTE — PATIENT INSTRUCTIONS
Please follow up with your primary care provider within 2-5 days if your signs and symptoms have not resolved or worsen.     If your condition worsens or fails to improve we recommend that you receive another evaluation at the emergency room immediately or contact your primary medical clinic to discuss your concerns.   You must understand that you have received an Urgent Care treatment only and that you may be released before all of your medical problems are known or treated. You, the patient, will arrange for follow up care as instructed.         Please follow up with Dr. Long regarding your elevated blood pressures. You likely have high blood pressure and need to be started on a medication. Please be sure to keep a blood pressure log and bring it to your next visit. Please stop the nasal spray since you have a sore in your nose. Please use the antibiotic ointment on the sore three times a day for 5 days. Please be gentle when blowing your nose.     General Instructions for Upper Respiratory Infection (URI):     Alternate Tylenol and Ibuprofen every 3 hrs for fever, pain and inflammation.   Avoid NSAIDs (Ibuprofen, Aleve, Motrin, Aspirin) if you are pregnant, or have advanced kidney disease or history of stomach ulcers/bleeding.     Sore throat/Post Nasal Drip:  Salt water gargles, chloraseptic spray, lozenges, or cough drops   Honey/lemon water or warm tea   Cepachol   Zantac will help if there is reflux from the post nasal drip and helpful to take at night     Sinus Congestion/Runny nose:  Zyrtec/Claritin/Allegra during the day and Benadryl at night as needed  Mucinex, Dayquil, or Coricidin   If you DO NOT have Hypertension (high blood pressure) or any history of palpitations, it is ok to take over the counter Sudafed or Mucinex D or Allegra-D or Claritin-D or Zyrtec-D.  If you do take one of the above, it is ok to combine that with plain over the counter Mucinex or Allegra or Claritin or Zyrtec. If, for  example, you are taking Zyrtec -D, you can combine that with Mucinex, but not Mucinex-D. If you are taking Mucinex-D, you can combine that with plain Allegra or Claritin or Zyrtec.  If you DO have Hypertension or palpitations, it is safe to take Coricidin HBP for relief of sinus symptoms.  Nasal saline spray reduces inflammation and dryness  Flonase OTC or Nasacort OTC to help decrease inflammation in nasal turbinates and allow sinuses to drain  Warm face compresses/hot showers as often as you can to open sinuses and allow to drain.   Vicks vapor rub and/or humidifier at night  Cold-eeze helps to reduce the duration of URI symptoms if taken early  Elderberry, Emergen-C, and/or Zinc to reduce duration of viral URI symptoms    Cough:  Robitussin or Delsym as needed  Cough drops  Vicks vapor rub and/or humidifier at night       Rest as much as you can     Your symptoms are likely viral and will typically last 7-10 days, maybe longer depending on how it affects your body.  You are contagious until day 5-7, so minimize contact with others to reduce the spread to others and stay home from work or school as we discussed. Dehydration is preventable but is one of the main reasons why you will feel so badly. Drink pedialyte, gatorade or propel. Stay hydrated.  Antibiotics are not needed unless a complication( such as Otitis Media, Bacterial sinus infection or pneumonia) develops. Taking antibiotics for Flu/Cold is not supported by evidence-based medicine and can expose you to unnecessary side effects of the medication, such as anaphylaxis, yeast infection and leads to antibiotic resistance.     Please follow up with your primary care provider within 5-7 days if your signs and symptoms have not resolved or worsen.  If your condition worsens or fails to improve we recommend that you receive another evaluation at the emergency  room immediately or contact your primary medical clinic to discuss your concerns.  You must understand  that you have received an Urgent Care treatment only and that you may be released before all of  your medical problems are known or treated. You, the patient, will arrange for follow up care as instructed.    Go to Emergency Room immediately if you experience any:  Chest pain, shortness of breath, wheezing or difficulty breathing,  Severe headache, face, neck or ear pain,  New rash,  Fever over 101.5º F (38.6 C) for more than three days,  Confusion, behavior change or seizure,  Severe weakness or dizziness or passing out

## 2024-09-12 NOTE — PROGRESS NOTES
"Subjective:      Patient ID: Elvi Foote is a 58 y.o. female.    Vitals:  height is 5' 4" (1.626 m) and weight is 128.8 kg (284 lb). Her oral temperature is 97.9 °F (36.6 °C). Her blood pressure is 169/109 (abnormal) and her pulse is 78. Her respiration is 18 and oxygen saturation is 97%.     Chief Complaint: Fatigue    Patient complaints: runny nose/congestion, fatigue and a headache x 1 week  -taking zyrtec and nasal spray with no relief  -she has not been around anyone sick  -headaches has been off and on, taking tylenol and it helps, frontal area, sensivity to light, has migraines but not as bad, achy, 3/10  -she has a sore in the left nostril that hurts          Fatigue  This is a new problem. The current episode started in the past 7 days. The problem occurs constantly. The problem has been unchanged. Associated symptoms include chills, congestion, fatigue and headaches. Pertinent negatives include no chest pain, coughing, fever, myalgias, nausea, sore throat or vomiting.       Constitution: Positive for chills and fatigue. Negative for fever.   HENT:  Positive for congestion. Negative for sore throat.    Cardiovascular:  Negative for chest pain.   Respiratory:  Negative for cough and shortness of breath.    Gastrointestinal:  Negative for nausea and vomiting.   Musculoskeletal:  Negative for muscle ache.   Neurological:  Positive for headaches.      Objective:     Physical Exam   Constitutional: obesity  HENT:   Head: Normocephalic and atraumatic.   Ears:   Right Ear: Tympanic membrane normal.   Left Ear: Tympanic membrane normal.   Mouth/Throat: Mucous membranes are moist. Oropharynx is clear.   Nasal abrasion seen in the left nostril, no discharge seen      Comments: Nasal abrasion seen in the left nostril, no discharge seen  Eyes: Conjunctivae are normal. Pupils are equal, round, and reactive to light.   Cardiovascular: Normal rate, regular rhythm and normal heart sounds.   Pulmonary/Chest: Effort " normal and breath sounds normal.   Abdominal: Normal appearance.   Neurological: She is alert. She has normal motor skills, normal sensation and intact cranial nerves (2-12). Coordination normal.       Assessment:     1. Upper respiratory tract infection, unspecified type    2. Runny nose    3. Fatigue, unspecified type    4. Acute nonintractable headache, unspecified headache type    5. Nasal abrasion, initial encounter        Plan:       Upper respiratory tract infection, unspecified type    Runny nose  -     SARS Coronavirus 2 Antigen, POCT Manual Read    Fatigue, unspecified type  -     SARS Coronavirus 2 Antigen, POCT Manual Read    Acute nonintractable headache, unspecified headache type  -     ketorolac injection 30 mg    Nasal abrasion, initial encounter  -     mupirocin (BACTROBAN) 2 % ointment; Apply topically 3 (three) times daily. for 5 days  Dispense: 22 g; Refill: 0      Please follow up with your primary care provider within 2-5 days if your signs and symptoms have not resolved or worsen.     If your condition worsens or fails to improve we recommend that you receive another evaluation at the emergency room immediately or contact your primary medical clinic to discuss your concerns.   You must understand that you have received an Urgent Care treatment only and that you may be released before all of your medical problems are known or treated. You, the patient, will arrange for follow up care as instructed.         Please follow up with Dr. Long regarding your elevated blood pressures. You likely have high blood pressure and need to be started on a medication. Please be sure to keep a blood pressure log and bring it to your next visit. Please stop the nasal spray since you have a sore in your nose. Please use the antibiotic ointment on the sore three times a day for 5 days. Please be gentle when blowing your nose.     General Instructions for Upper Respiratory Infection (URI):     Alternate Tylenol  and Ibuprofen every 3 hrs for fever, pain and inflammation.   Avoid NSAIDs (Ibuprofen, Aleve, Motrin, Aspirin) if you are pregnant, or have advanced kidney disease or history of stomach ulcers/bleeding.     Sore throat/Post Nasal Drip:  Salt water gargles, chloraseptic spray, lozenges, or cough drops   Honey/lemon water or warm tea   Cepachol   Zantac will help if there is reflux from the post nasal drip and helpful to take at night     Sinus Congestion/Runny nose:  Zyrtec/Claritin/Allegra during the day and Benadryl at night as needed  Mucinex, Dayquil, or Coricidin   If you DO NOT have Hypertension (high blood pressure) or any history of palpitations, it is ok to take over the counter Sudafed or Mucinex D or Allegra-D or Claritin-D or Zyrtec-D.  If you do take one of the above, it is ok to combine that with plain over the counter Mucinex or Allegra or Claritin or Zyrtec. If, for example, you are taking Zyrtec -D, you can combine that with Mucinex, but not Mucinex-D. If you are taking Mucinex-D, you can combine that with plain Allegra or Claritin or Zyrtec.  If you DO have Hypertension or palpitations, it is safe to take Coricidin HBP for relief of sinus symptoms.  Nasal saline spray reduces inflammation and dryness  Flonase OTC or Nasacort OTC to help decrease inflammation in nasal turbinates and allow sinuses to drain  Warm face compresses/hot showers as often as you can to open sinuses and allow to drain.   Vicks vapor rub and/or humidifier at night  Cold-eeze helps to reduce the duration of URI symptoms if taken early  Elderberry, Emergen-C, and/or Zinc to reduce duration of viral URI symptoms    Cough:  Robitussin or Delsym as needed  Cough drops  Vicks vapor rub and/or humidifier at night       Rest as much as you can     Your symptoms are likely viral and will typically last 7-10 days, maybe longer depending on how it affects your body.  You are contagious until day 5-7, so minimize contact with others to  reduce the spread to others and stay home from work or school as we discussed. Dehydration is preventable but is one of the main reasons why you will feel so badly. Drink pedialyte, gatorade or propel. Stay hydrated.  Antibiotics are not needed unless a complication( such as Otitis Media, Bacterial sinus infection or pneumonia) develops. Taking antibiotics for Flu/Cold is not supported by evidence-based medicine and can expose you to unnecessary side effects of the medication, such as anaphylaxis, yeast infection and leads to antibiotic resistance.     Please follow up with your primary care provider within 5-7 days if your signs and symptoms have not resolved or worsen.  If your condition worsens or fails to improve we recommend that you receive another evaluation at the emergency  room immediately or contact your primary medical clinic to discuss your concerns.  You must understand that you have received an Urgent Care treatment only and that you may be released before all of  your medical problems are known or treated. You, the patient, will arrange for follow up care as instructed.    Go to Emergency Room immediately if you experience any:  Chest pain, shortness of breath, wheezing or difficulty breathing,  Severe headache, face, neck or ear pain,  New rash,  Fever over 101.5º F (38.6 C) for more than three days,  Confusion, behavior change or seizure,  Severe weakness or dizziness or passing out    Medical Decision Making:   Differential Diagnosis:   Headache 2/2 to URI  Nose abrasion  Clinical Tests:   Lab Tests: Ordered and Reviewed       <> Summary of Lab: Covid neg  Urgent Care Management:  Patient complaints: runny nose/congestion, fatigue and a headache x 1 week  -taking zyrtec and nasal spray with no relief  -she has not been around anyone sick  -headaches has been off and on, taking tylenol and it helps, frontal area, sensivity to light, has migraines but not as bad, achy, 3/10  -she has a sore in the  left nostril that hurts.  Vitals WNL except for a BP of 169/109.   Physical Exam   Constitutional: obesity  HENT:   Head: Normocephalic and atraumatic.   Ears:   Right Ear: Tympanic membrane normal.   Left Ear: Tympanic membrane normal.   Mouth/Throat: Mucous membranes are moist. Oropharynx is clear.   Nasal abrasion seen in the left nostril, no discharge seen      Comments: Nasal abrasion seen in the left nostril, no discharge seen  Eyes: Conjunctivae are normal. Pupils are equal, round, and reactive to light.   Cardiovascular: Normal rate, regular rhythm and normal heart sounds.   Pulmonary/Chest: Effort normal and breath sounds normal.   Abdominal: Normal appearance.   Neurological: She is alert. She has normal motor skills, normal sensation and intact cranial nerves (2-12). Coordination normal.   The pt was given a Toradol shot in office. The pt was sent home with an abx cream. Pt likely has a diagnoses of hypertension. Pt was told to follow up with her PCP to start medication.

## 2024-11-27 ENCOUNTER — PATIENT MESSAGE (OUTPATIENT)
Dept: RESEARCH | Facility: HOSPITAL | Age: 59
End: 2024-11-27
Payer: MEDICAID

## 2025-01-18 ENCOUNTER — PATIENT MESSAGE (OUTPATIENT)
Dept: ADMINISTRATIVE | Facility: OTHER | Age: 60
End: 2025-01-18
Payer: MEDICAID

## 2025-06-24 ENCOUNTER — HOSPITAL ENCOUNTER (OUTPATIENT)
Dept: RADIOLOGY | Facility: CLINIC | Age: 60
Discharge: HOME | End: 2025-06-24
Attending: ORTHOPAEDIC SURGERY
Payer: MEDICARE

## 2025-06-24 ENCOUNTER — OFFICE VISIT (OUTPATIENT)
Dept: ORTHOPEDICS | Facility: CLINIC | Age: 60
End: 2025-06-24
Payer: MEDICARE

## 2025-06-24 VITALS — HEIGHT: 64 IN | BODY MASS INDEX: 50.02 KG/M2 | WEIGHT: 293 LBS

## 2025-06-24 DIAGNOSIS — M17.0 PRIMARY OSTEOARTHRITIS OF KNEES, BILATERAL: Primary | ICD-10-CM

## 2025-06-24 DIAGNOSIS — E66.01 MORBID OBESITY: ICD-10-CM

## 2025-06-24 DIAGNOSIS — M25.561 PAIN IN BOTH KNEES, UNSPECIFIED CHRONICITY: ICD-10-CM

## 2025-06-24 DIAGNOSIS — M25.562 PAIN IN BOTH KNEES, UNSPECIFIED CHRONICITY: ICD-10-CM

## 2025-06-24 PROCEDURE — 99203 OFFICE O/P NEW LOW 30 MIN: CPT | Mod: ,,, | Performed by: ORTHOPAEDIC SURGERY

## 2025-06-24 PROCEDURE — 1159F MED LIST DOCD IN RCRD: CPT | Mod: CPTII,,, | Performed by: ORTHOPAEDIC SURGERY

## 2025-06-24 PROCEDURE — 3008F BODY MASS INDEX DOCD: CPT | Mod: CPTII,,, | Performed by: ORTHOPAEDIC SURGERY

## 2025-06-24 PROCEDURE — 73564 X-RAY EXAM KNEE 4 OR MORE: CPT | Mod: 50,,, | Performed by: ORTHOPAEDIC SURGERY

## 2025-06-24 RX ORDER — DULOXETINE HYDROCHLORIDE 20 MG/1
20 CAPSULE, DELAYED RELEASE ORAL
COMMUNITY
Start: 2025-01-08

## 2025-06-24 NOTE — PROGRESS NOTES
Subjective:      Patient ID: Elvi Foote is a 59 y.o. female.    Chief Complaint: Knee Pain (Brandon knee - ongoing for a couple years. Progressivlely getting worse. R is worse. Reports pain all over the knee. Previously seen Dr. Krishnan in [place]. Hx of cortisone injection with no relief. Last injection 06/05/2025  )    HPI:     History of Present Illness    CHIEF COMPLAINT:  - Bilateral knee arthritis    HPI:  Elvi presents for evaluation of knee arthritis. She reports knee pain managed with steroid injections for about two years, receiving approximately 5-6 injections in total. Initially, the injections were helpful for pain management, but their effectiveness has decreased over time. She states that they were helping with the pain, but now they are helping less. Her last knee injection was two weeks ago.    She previously saw Dr. Gallegos, who discussed the possibility of knee replacement and raised concerns about her weight. She has attempted various weight loss methods, including different diets and standing nutrition, but has struggled to achieve significant weight loss. She reports a period of weight loss during a de la paz with depression.    She expresses concern about managing her knee pain and uncertainty about how long she can continue without surgery. She denies being diabetic but has not been tested for pre-diabetes. She recently switched to Medicare through [employer] this month.    She denies any history of weight loss surgery.    PREVIOUS TREATMENTS:  - Steroid injections: Received for knee arthritis for about two years, total of 5-6 injections, most recent injection two weeks ago, initially helpful for pain but effectiveness decreasing over time  - Weight loss injections: Tried in the past under Dr. Pearl in [place], not effective    IMAGING:  - XR Bilateral Knees: Left knee: bone-on-bone arthritis; Right knee: bone spurs and bone-on-bone arthritis on the outside  "portion      ROS:  Psychiatric: +depression          Past Medical History:   Diagnosis Date    Anxiety     Arthritis     Knee pain     RIGHT     Migraines     Personal history of colonic polyps     Plantar fasciitis     Pseudotumor      Past Surgical History:   Procedure Laterality Date    CHOLECYSTECTOMY      COLONOSCOPY      TONSILLECTOMY       Social History[1]    Current Medications[2]  Review of patient's allergies indicates:  No Known Allergies    BP (!) (P) 164/97   Pulse (P) 69   Ht 5' 4" (1.626 m)   Wt 133.8 kg (295 lb)   BMI 50.64 kg/m²     Comprehensive review of systems completed and negative except as per HPI.        Objective:   General: Well-developed, well-nourished.  Neuro: Alert and oriented x 3.  Psych: Normal mood and affect.  Card: Regular rate and rhythm  Resp: Respirations regular and unlabored    Right Knee Exam:  Valgus deformity. Range of motion from 5-110 degrees. Negative patella grind and equal subluxation of knee cap medial and lateral < 1cm. Negative patella tendon tenderness. Negative Lachman and anterior drawer test. Negative posterior drawer test. Negative varus and valgus stress test. Negative medial joint line tenderness.  Positive lateral joint line tenderness. 4/5 strength and normal skin appearance. Sensibility normal.      Left Knee Exam:  Varus deformity. Range of motion from 5-110 degrees. Negative patella grind and equal subluxation of knee cap medial and lateral < 1cm. Negative patella tendon tenderness. Negative Lachman and anterior drawer test. Negative posterior drawer test. Negative varus and valgus stress test. Positive medial joint line tenderness. Negative lateral joint line tenderness. 4/5 strength and normal skin appearance. Sensibility normal.        Assessment:         1. Primary osteoarthritis of knees, bilateral    2. Morbid obesity          Plan:       Orders Placed This Encounter    X-Ray Knee Complete 4 Or More Views Bilat        Imaging and exam " findings discussed.     Assessment & Plan    PROCEDURES:  - Discussed knee replacement as a potential future procedure. Elvi needs to lose weight first due to increased risk of complications at current BMI.  - Risks include infection, blood clots, and difficulty with rehabilitation at current weight.  - Discussed gel injection (hyaluronic acid) as a possible treatment option in about 120 days, pending insurance approval.  - Gel injection works differently from steroid injections and acts as a lubricant for the knee.    REFERRALS:  - I want him to refer this patient to weight loss clinic but unfortunately the weight loss clinic does not accept her insurance.    FOLLOW UP:  - Follow up after insurance approval for gel injection.               All questions were answered. Patient happy and in agreement with the plan.     This note was generated with the assistance of ambient listening technology. Verbal consent was obtained by the patient and accompanying visitor(s) for the recording of patient appointment to facilitate this note. I attest to having reviewed and edited the generated note for accuracy, though some syntax or spelling errors may persist. Please contact the author of this note for any clarification.         [1]   Social History  Socioeconomic History    Marital status:    Tobacco Use    Smoking status: Never     Passive exposure: Never    Smokeless tobacco: Never   Substance and Sexual Activity    Alcohol use: Not Currently    Drug use: Never    Sexual activity: Yes     Social Drivers of Health     Financial Resource Strain: High Risk (6/19/2025)    Overall Financial Resource Strain (CARDIA)     Difficulty of Paying Living Expenses: Hard   Food Insecurity: No Food Insecurity (6/19/2025)    Hunger Vital Sign     Worried About Running Out of Food in the Last Year: Never true     Ran Out of Food in the Last Year: Never true   Transportation Needs: No Transportation Needs (6/19/2025)    PRAPARE -  Transportation     Lack of Transportation (Medical): No     Lack of Transportation (Non-Medical): No   Physical Activity: Inactive (6/19/2025)    Exercise Vital Sign     Days of Exercise per Week: 0 days     Minutes of Exercise per Session: 0 min   Stress: No Stress Concern Present (6/19/2025)    [employer] - Occupational Stress Questionnaire     Feeling of Stress : Only a little   Housing Stability: High Risk (6/19/2025)    Housing Stability Vital Sign     Unable to Pay for Housing in the Last Year: Yes     Number of Times Moved in the Last Year: 0     Homeless in the Last Year: No   [2]   Current Outpatient Medications:     CYMBALTA 20 mg capsule, Take 20 mg by mouth., Disp: , Rfl:     pregabalin (LYRICA) 50 MG capsule, , Disp: , Rfl:     amitriptyline (ELAVIL) 10 MG tablet, Take 10 mg by mouth nightly as needed for Insomnia., Disp: , Rfl:     CAPLYTA 42 mg Cap, , Disp: , Rfl:     fluticasone propionate (FLONASE) 50 mcg/actuation nasal spray, SPRAY 1 SPRAY INTO EACH NOSTRIL EVERY DAY (Patient not taking: Reported on 6/24/2025), Disp: 48 mL, Rfl: 1    LIDOcaine 4 % SprA, Apply topically. INHALE 1-2 SPRAYS IN THE NOSTRIL EVERY 2 HRS PRN (Patient not taking: Reported on 6/24/2025), Disp: , Rfl:     oxybutynin (DITROPAN) 5 MG Tab, Take 5 mg by mouth 3 (three) times daily. TAKING ONLY BID (Patient not taking: Reported on 6/24/2025), Disp: , Rfl:

## 2025-07-14 RX ORDER — ONDANSETRON 4 MG/1
4 TABLET, ORALLY DISINTEGRATING ORAL 3 TIMES DAILY
COMMUNITY
Start: 2025-02-08

## 2025-07-14 RX ORDER — PREGABALIN 50 MG/1
50 CAPSULE ORAL
COMMUNITY

## 2025-07-14 RX ORDER — GUAIFENESIN 1200 MG
650 TABLET, EXTENDED RELEASE 12 HR ORAL
COMMUNITY
Start: 2025-05-12

## 2025-07-14 RX ORDER — DIAZEPAM 5 MG/1
5 TABLET ORAL
COMMUNITY
Start: 2025-06-09

## 2025-07-14 RX ORDER — DICLOFENAC SODIUM 50 MG/1
50 TABLET, DELAYED RELEASE ORAL 2 TIMES DAILY PRN
COMMUNITY
Start: 2025-05-14

## 2025-07-14 RX ORDER — CYCLOBENZAPRINE HCL 10 MG
10 TABLET ORAL
COMMUNITY
Start: 2025-05-12

## 2025-07-14 RX ORDER — AMLODIPINE BESYLATE 5 MG/1
5 TABLET ORAL
COMMUNITY

## 2025-07-14 RX ORDER — LUMATEPERONE 21 MG/1
1 CAPSULE ORAL NIGHTLY
COMMUNITY
Start: 2025-06-12

## 2025-07-14 RX ORDER — INDOMETHACIN 50 MG/1
50 CAPSULE ORAL
COMMUNITY

## 2025-07-17 ENCOUNTER — OFFICE VISIT (OUTPATIENT)
Dept: PRIMARY CARE CLINIC | Facility: CLINIC | Age: 60
End: 2025-07-17
Payer: MEDICARE

## 2025-07-17 VITALS
DIASTOLIC BLOOD PRESSURE: 96 MMHG | RESPIRATION RATE: 18 BRPM | SYSTOLIC BLOOD PRESSURE: 155 MMHG | HEART RATE: 60 BPM | WEIGHT: 288 LBS | OXYGEN SATURATION: 99 % | BODY MASS INDEX: 49.17 KG/M2 | HEIGHT: 64 IN

## 2025-07-17 DIAGNOSIS — E66.813 CLASS 3 SEVERE OBESITY WITHOUT SERIOUS COMORBIDITY WITH BODY MASS INDEX (BMI) OF 50.0 TO 59.9 IN ADULT, UNSPECIFIED OBESITY TYPE: ICD-10-CM

## 2025-07-17 DIAGNOSIS — Z12.39 ENCOUNTER FOR SCREENING FOR MALIGNANT NEOPLASM OF BREAST, UNSPECIFIED SCREENING MODALITY: ICD-10-CM

## 2025-07-17 DIAGNOSIS — Z00.00 WELLNESS EXAMINATION: ICD-10-CM

## 2025-07-17 DIAGNOSIS — G47.33 OBSTRUCTIVE SLEEP APNEA SYNDROME: Primary | ICD-10-CM

## 2025-07-17 DIAGNOSIS — M17.9 OSTEOARTHRITIS OF KNEE, UNSPECIFIED LATERALITY, UNSPECIFIED OSTEOARTHRITIS TYPE: ICD-10-CM

## 2025-07-17 DIAGNOSIS — Z12.31 ENCOUNTER FOR SCREENING MAMMOGRAM FOR MALIGNANT NEOPLASM OF BREAST: ICD-10-CM

## 2025-07-17 DIAGNOSIS — I10 HYPERTENSION, UNSPECIFIED TYPE: ICD-10-CM

## 2025-07-17 PROCEDURE — 1159F MED LIST DOCD IN RCRD: CPT | Mod: CPTII,,, | Performed by: INTERNAL MEDICINE

## 2025-07-17 PROCEDURE — 99214 OFFICE O/P EST MOD 30 MIN: CPT | Mod: S$PBB,25,, | Performed by: INTERNAL MEDICINE

## 2025-07-17 PROCEDURE — 3077F SYST BP >= 140 MM HG: CPT | Mod: CPTII,,, | Performed by: INTERNAL MEDICINE

## 2025-07-17 PROCEDURE — 4010F ACE/ARB THERAPY RXD/TAKEN: CPT | Mod: CPTII,,, | Performed by: INTERNAL MEDICINE

## 2025-07-17 PROCEDURE — 3008F BODY MASS INDEX DOCD: CPT | Mod: CPTII,,, | Performed by: INTERNAL MEDICINE

## 2025-07-17 PROCEDURE — G0438 PPPS, INITIAL VISIT: HCPCS | Mod: ,,, | Performed by: INTERNAL MEDICINE

## 2025-07-17 PROCEDURE — 3080F DIAST BP >= 90 MM HG: CPT | Mod: CPTII,,, | Performed by: INTERNAL MEDICINE

## 2025-07-17 RX ORDER — TRAMADOL HYDROCHLORIDE 50 MG/1
50 TABLET, FILM COATED ORAL EVERY 6 HOURS PRN
COMMUNITY
Start: 2025-07-10

## 2025-07-17 RX ORDER — LOSARTAN POTASSIUM 50 MG/1
50 TABLET ORAL DAILY
Qty: 90 TABLET | Refills: 3 | Status: SHIPPED | OUTPATIENT
Start: 2025-07-17 | End: 2026-07-17

## 2025-07-17 RX ORDER — LOSARTAN POTASSIUM 50 MG/1
50 TABLET ORAL DAILY
Qty: 90 TABLET | Refills: 3 | Status: SHIPPED | OUTPATIENT
Start: 2025-07-17 | End: 2025-07-17

## 2025-07-17 NOTE — PROGRESS NOTES
"  Elvi Foote presented for a  Medicare AWV and comprehensive Health Risk Assessment today. The following components were reviewed and updated:      Health Risk Assessment      Health Maintenance Topics with due status: Not Due       Topic Last Completion Date    Influenza Vaccine 12/09/2020    Lipid Panel 12/09/2020    Cervical Cancer Screening 08/05/2022    Colorectal Cancer Screening 06/23/2023    RSV Vaccine (Age 60+ and Pregnant patients) Not Due      Patient Care Team:  Angie Long MD as PCP - General (Pediatrics)  Joseph Rosen MD (Neurology)          See Completed Assessments for Annual Wellness Visit within the encounter summary.             The following assessments were completed:    Depression Screening                  Cognitive Function Screening      Nutrition Screening  ADL Screening  PAQ Screening  Mini Mental         Vitals:    07/17/25 1101   BP: (!) 155/96   BP Location: Left forearm   Patient Position: Sitting   Pulse: 60   Resp: 18   SpO2: 99%   Weight: 130.6 kg (288 lb)   Height: 5' 4" (1.626 m)     Body mass index is 49.44 kg/m².    Physical Exam  Vitals reviewed.   Constitutional:       Appearance: Normal appearance. She is obese.   HENT:      Head: Normocephalic and atraumatic.   Eyes:      Extraocular Movements: Extraocular movements intact.      Pupils: Pupils are equal, round, and reactive to light.   Cardiovascular:      Rate and Rhythm: Normal rate and regular rhythm.   Pulmonary:      Effort: Pulmonary effort is normal.      Breath sounds: Normal breath sounds.   Abdominal:      General: Bowel sounds are normal.   Skin:     General: Skin is warm.      Capillary Refill: Capillary refill takes less than 2 seconds.   Neurological:      Mental Status: She is alert and oriented to person, place, and time.   Psychiatric:         Mood and Affect: Mood normal.              Diagnoses and health risks identified today and associated recommendations/orders:    1. Encounter " for screening for malignant neoplasm of breast, unspecified screening modality    - Mammo Digital Screening Bilat; Future  - Mammo Digital Screening Bilat    2. Encounter for screening mammogram for malignant neoplasm of breast    - Mammo Digital Screening Bilat; Future  - Mammo Digital Screening Bilat    3. Wellness examination    - CBC Auto Differential; Future  - Comprehensive Metabolic Panel; Future  - Lipid Panel; Future  - Hemoglobin A1C; Future  - CBC Auto Differential  - Comprehensive Metabolic Panel  - Lipid Panel  - Hemoglobin A1C    4. Hypertension, unspecified type    - losartan (COZAAR) 50 MG tablet; Take 1 tablet (50 mg total) by mouth once daily.  Dispense: 90 tablet; Refill: 3    5. Obstructive sleep apnea syndrome    - tirzepatide, weight loss, (ZEPBOUND) 2.5 mg/0.5 mL PnIj; Inject 2.5 mg into the skin every 7 days.  Dispense: 12 mL; Refill: 0  -Patient's sleep study is in media but she does not wear a cpap as she is unable to tolerate it        6. Class 3 severe obesity without serious comorbidity with body mass index (BMI) of 50.0 to 59.9 in adult, unspecified obesity type    - tirzepatide, weight loss, (ZEPBOUND) 2.5 mg/0.5 mL PnIj; Inject 2.5 mg into the skin every 7 days.  Dispense: 12 mL; Refill: 0    7. Osteoarthritis of knee, unspecified laterality, unspecified osteoarthritis type  -Established with Orthopedics, she has been recommended to lose weight before surgery can be considered       Provided Elvi with a 5-10 year written screening schedule and personal prevention plan. Recommendations were developed using the USPSTF age appropriate recommendations. Education, counseling, and referrals were provided as needed. After Visit Summary printed and given to patient which includes a list of additional screenings\tests needed.    I offered to discuss end of life issues, including information on how to make advance directives that the patient could use to name someone who would make medical  decisions on their behalf if they became too ill to make themselves.    ___Patient declined - already done.  ___Virtual Visit, not discussed  _x__Patient is interested        No follow-ups on file.    Angie Long MD      Future Appointments   Date Time Provider Department Center   11/19/2025 10:45 AM Angie Long MD [facility] PR[facility]

## 2025-07-22 ENCOUNTER — PATIENT MESSAGE (OUTPATIENT)
Dept: PRIMARY CARE CLINIC | Facility: CLINIC | Age: 60
End: 2025-07-22
Payer: MEDICARE

## 2025-07-23 ENCOUNTER — TELEPHONE (OUTPATIENT)
Dept: PRIMARY CARE CLINIC | Facility: CLINIC | Age: 60
End: 2025-07-23
Payer: MEDICARE

## 2025-07-23 RX ORDER — TIRZEPATIDE 2.5 MG/.5ML
2.5 INJECTION, SOLUTION SUBCUTANEOUS
Qty: 12 ML | Refills: 0 | Status: SHIPPED | OUTPATIENT
Start: 2025-07-23

## 2025-07-23 NOTE — TELEPHONE ENCOUNTER
PA FOR ZEPBOUND HAS BEEN COMPLETED AND SENT TO HER INS AND NOW WAITING ON AN APPROVAL WHICH CAN TAKE UP TO 72 HRS

## 2025-07-23 NOTE — TELEPHONE ENCOUNTER
Pa has been completed for request of Zepbound and sent to her ins --now waiting on a reply which can take up to 72 hrs

## 2025-07-24 ENCOUNTER — TELEPHONE (OUTPATIENT)
Dept: PRIMARY CARE CLINIC | Facility: CLINIC | Age: 60
End: 2025-07-24
Payer: MEDICARE

## 2025-07-24 NOTE — TELEPHONE ENCOUNTER
Pa for Zepbound was completed and CORINA  was the Dx along with Obesity--pt states Zepbound was for an exception

## 2025-07-24 NOTE — TELEPHONE ENCOUNTER
PT'S REQUEST FOR ZEPBOUND HAS BEEN DENIED PER HER INS==PT HAS BEEN INFORMED--PT STATES HER INS TOLD HER THE MOUNJARO WOULD BE COVERED SINCE SHE HAS CORINA -ASKED PT IF SHE TOLD HER INS SHE WAS NOT A DM AND SHE DID NOT

## 2025-07-25 ENCOUNTER — TELEPHONE (OUTPATIENT)
Dept: PRIMARY CARE CLINIC | Facility: CLINIC | Age: 60
End: 2025-07-25
Payer: MEDICARE

## 2025-07-25 NOTE — TELEPHONE ENCOUNTER
Pt was called and informed of Dr Long message about Mounjaro being for diabetes--pt states she told her ins she was not diabetic but has CORINA and ins stated med would be covered--message sent to Dr Long

## 2025-07-25 NOTE — TELEPHONE ENCOUNTER
Message sent to Dr Long about appeal--pt's medical records have been sent in with the pa with DX of CORINA as requested

## 2025-07-25 NOTE — TELEPHONE ENCOUNTER
Pt states she called her ins company again this AM and was told by a different rep that the Mounjaro would only be covered if she was a diabetic which I had informed pt of Dr Long's message --now pt is wanting an appeal to be done even though I have informed her that her med records have been sent and the DX listed was CORINA and Obesity and ins reviews all records that is sent in before a decision is made--pt is now wanting ins to review again

## 2025-07-25 NOTE — TELEPHONE ENCOUNTER
Pt is now wanting an appeal done after calling her ins again this am because she was informed that it would cover for CORINA --I read her the letter from her ins on why med was denied and that her medical records was sent in at the time the pa was completed for ins to review --pt is requesting that they review her records again

## 2025-07-25 NOTE — TELEPHONE ENCOUNTER
Pt called back after calling her ins again this am and was told by a different rep that she has to be a diabetic to receive Miunjaro

## 2025-07-25 NOTE — TELEPHONE ENCOUNTER
Copied from CRM #[id number]. Topic: General Inquiry - Return Call  >> Jul 25, 2025  8:47 AM Poly wrote:  Type:  Patient Returning Call    Who Called:Elvi   Who Left Message for Patient: Nuris   Does the patient know what this is regarding?: Prior Authorization for medication   Would the patient rather a call back or a response via [facility]?  Call back   Best Call Back Number:[phone] (home)    Additional Information:     DENZEL Tomlinson

## 2025-07-30 ENCOUNTER — CLINICAL SUPPORT (OUTPATIENT)
Dept: OBSTETRICS AND GYNECOLOGY | Facility: CLINIC | Age: 60
End: 2025-07-30
Payer: MEDICARE

## 2025-07-30 DIAGNOSIS — Z00.00 WELLNESS EXAMINATION: Primary | ICD-10-CM

## 2025-07-31 ENCOUNTER — TELEPHONE (OUTPATIENT)
Dept: PRIMARY CARE CLINIC | Facility: CLINIC | Age: 60
End: 2025-07-31

## 2025-07-31 NOTE — TELEPHONE ENCOUNTER
----- Message from Angie Long MD sent at 7/31/2025  8:57 AM CDT -----  Normal labs    ----- Message -----  From: Interface, Lab In [facility]  Sent: 7/30/2025   6:01 PM CDT  To: Angie Long MD

## 2025-08-07 ENCOUNTER — PATIENT MESSAGE (OUTPATIENT)
Dept: PRIMARY CARE CLINIC | Facility: CLINIC | Age: 60
End: 2025-08-07
Payer: MEDICARE

## 2025-08-11 ENCOUNTER — TELEPHONE (OUTPATIENT)
Dept: PRIMARY CARE CLINIC | Facility: CLINIC | Age: 60
End: 2025-08-11
Payer: MEDICARE

## 2025-08-19 ENCOUNTER — TELEPHONE (OUTPATIENT)
Dept: PRIMARY CARE CLINIC | Facility: CLINIC | Age: 60
End: 2025-08-19
Payer: MEDICARE

## 2025-08-28 ENCOUNTER — PATIENT MESSAGE (OUTPATIENT)
Dept: PRIMARY CARE CLINIC | Facility: CLINIC | Age: 60
End: 2025-08-28
Payer: MEDICARE

## 2025-09-04 ENCOUNTER — OFFICE VISIT (OUTPATIENT)
Dept: PRIMARY CARE CLINIC | Facility: CLINIC | Age: 60
End: 2025-09-04
Payer: MEDICARE

## 2025-09-04 VITALS
HEIGHT: 64 IN | DIASTOLIC BLOOD PRESSURE: 77 MMHG | OXYGEN SATURATION: 99 % | BODY MASS INDEX: 46.1 KG/M2 | HEART RATE: 68 BPM | SYSTOLIC BLOOD PRESSURE: 126 MMHG | WEIGHT: 270 LBS

## 2025-09-04 DIAGNOSIS — G47.33 OBSTRUCTIVE SLEEP APNEA SYNDROME: ICD-10-CM

## 2025-09-04 DIAGNOSIS — Z01.818 PRE-OP EVALUATION: ICD-10-CM

## 2025-09-04 DIAGNOSIS — E66.813 CLASS 3 SEVERE OBESITY WITH SERIOUS COMORBIDITY AND BODY MASS INDEX (BMI) OF 45.0 TO 49.9 IN ADULT, UNSPECIFIED OBESITY TYPE: ICD-10-CM

## 2025-09-04 DIAGNOSIS — I10 HYPERTENSION, UNSPECIFIED TYPE: Primary | ICD-10-CM

## 2025-09-04 PROCEDURE — 3044F HG A1C LEVEL LT 7.0%: CPT | Mod: CPTII,S$GLB,, | Performed by: INTERNAL MEDICINE

## 2025-09-04 PROCEDURE — 3074F SYST BP LT 130 MM HG: CPT | Mod: CPTII,S$GLB,, | Performed by: INTERNAL MEDICINE

## 2025-09-04 PROCEDURE — 4010F ACE/ARB THERAPY RXD/TAKEN: CPT | Mod: CPTII,S$GLB,, | Performed by: INTERNAL MEDICINE

## 2025-09-04 PROCEDURE — 99214 OFFICE O/P EST MOD 30 MIN: CPT | Mod: S$GLB,,, | Performed by: INTERNAL MEDICINE

## 2025-09-04 PROCEDURE — 1159F MED LIST DOCD IN RCRD: CPT | Mod: CPTII,S$GLB,, | Performed by: INTERNAL MEDICINE

## 2025-09-04 PROCEDURE — 3008F BODY MASS INDEX DOCD: CPT | Mod: CPTII,S$GLB,, | Performed by: INTERNAL MEDICINE

## 2025-09-04 PROCEDURE — 3078F DIAST BP <80 MM HG: CPT | Mod: CPTII,S$GLB,, | Performed by: INTERNAL MEDICINE

## 2025-09-04 RX ORDER — AMLODIPINE BESYLATE 5 MG/1
5 TABLET ORAL DAILY
Qty: 90 TABLET | Refills: 0 | Status: SHIPPED | OUTPATIENT
Start: 2025-09-04

## 2025-09-04 RX ORDER — CELECOXIB 200 MG/1
200 CAPSULE ORAL 2 TIMES DAILY PRN
COMMUNITY
Start: 2025-07-09